# Patient Record
Sex: FEMALE | Race: WHITE | NOT HISPANIC OR LATINO | Employment: OTHER | URBAN - METROPOLITAN AREA
[De-identification: names, ages, dates, MRNs, and addresses within clinical notes are randomized per-mention and may not be internally consistent; named-entity substitution may affect disease eponyms.]

---

## 2018-02-04 ENCOUNTER — OFFICE VISIT (OUTPATIENT)
Dept: URGENT CARE | Facility: MEDICAL CENTER | Age: 73
End: 2018-02-04
Payer: COMMERCIAL

## 2018-02-04 DIAGNOSIS — S61.213A LACERATION OF LEFT MIDDLE FINGER WITHOUT FOREIGN BODY WITHOUT DAMAGE TO NAIL, INITIAL ENCOUNTER: Primary | ICD-10-CM

## 2018-02-04 PROCEDURE — 99203 OFFICE O/P NEW LOW 30 MIN: CPT | Performed by: PHYSICIAN ASSISTANT

## 2018-02-04 PROCEDURE — 90715 TDAP VACCINE 7 YRS/> IM: CPT

## 2018-02-04 PROCEDURE — 12001 RPR S/N/AX/GEN/TRNK 2.5CM/<: CPT | Performed by: PHYSICIAN ASSISTANT

## 2018-02-04 RX ORDER — LEVOTHYROXINE SODIUM 0.15 MG/1
TABLET ORAL
COMMUNITY
Start: 2017-12-07

## 2018-02-04 RX ORDER — NEBIVOLOL 5 MG/1
TABLET ORAL
COMMUNITY
Start: 2017-12-07

## 2018-02-04 RX ORDER — CANDESARTAN 32 MG/1
TABLET ORAL
COMMUNITY
Start: 2017-12-07

## 2018-02-04 RX ORDER — ATORVASTATIN CALCIUM 80 MG/1
TABLET, FILM COATED ORAL
COMMUNITY
Start: 2017-12-07

## 2018-02-04 RX ORDER — HYDROCHLOROTHIAZIDE 25 MG/1
TABLET ORAL
COMMUNITY
Start: 2017-12-07

## 2018-02-04 NOTE — PROGRESS NOTES
Assessment/Plan:    1  Tetanus given left deltoid IM  2  Return for suture removal in 7-10 days if unavailable to go to PCP  3  Look for increase redness, red streaking, fevers, or chills  If this happens go to ER or see PCP without delay  Patient Instructions   Finger Laceration   WHAT YOU NEED TO KNOW:   A finger laceration is a deep cut in your skin  It is often caused by a sharp object, such as a knife, or blunt force to your finger  Your blood vessels, bones, joints, tendons, or nerves may also be injured  DISCHARGE INSTRUCTIONS:   Return to the emergency department if:   · Your wound comes apart  · Blood soaks through your bandage  · You have severe pain in your finger or hand  · Your finger is pale and cold  · You have sudden trouble moving your finger  · Your swelling suddenly gets worse  · You have red streaks on your skin coming from your wound  Contact your healthcare provider or hand specialist if:   · You have new numbness or tingling  · Your finger feels warm, looks swollen or red, and is draining pus  · You have a fever  · You have questions or concerns about your condition or care  Medicines: You may  need any of the following:  · Antibiotics  help prevent a bacterial infection  · Acetaminophen  decreases pain and fever  It is available without a doctor's order  Ask how much to take and how often to take it  Follow directions  Read the labels of all other medicines you are using to see if they also contain acetaminophen, or ask your doctor or pharmacist  Acetaminophen can cause liver damage if not taken correctly  Do not use more than 4 grams (4,000 milligrams) total of acetaminophen in one day  · Prescription pain medicine  may be given  Ask your healthcare provider how to take this medicine safely  Some prescription pain medicines contain acetaminophen   Do not take other medicines that contain acetaminophen without talking to your healthcare provider  Too much acetaminophen may cause liver damage  Prescription pain medicine may cause constipation  Ask your healthcare provider how to prevent or treat constipation  · Take your medicine as directed  Contact your healthcare provider if you think your medicine is not helping or if you have side effects  Tell him or her if you are allergic to any medicine  Keep a list of the medicines, vitamins, and herbs you take  Include the amounts, and when and why you take them  Bring the list or the pill bottles to follow-up visits  Carry your medicine list with you in case of an emergency  Self-care:   · Apply ice  on your finger for 15 to 20 minutes every hour or as directed  Use an ice pack, or put crushed ice in a plastic bag  Cover it with a towel before you apply it to your skin  Ice helps prevent tissue damage and decreases swelling and pain  · Elevate  your hand above the level of your heart as often as you can  This will help decrease swelling and pain  Prop your hand on pillows or blankets to keep it elevated comfortably  · Wear your splint as directed  A splint will decrease movement and stress on your wound  The splint may help your wound heal faster  Ask your healthcare provider how to apply and remove a splint  · Apply ointments to decrease scarring  Do not apply ointments until your healthcare provider says it is okay  You may need to wait until your wound is healed  Ask which ointment to buy and how often to use it  Wound care:   · Do not get your wound wet until your healthcare provider says it is okay  Do not soak your hand in water  Do not go swimming until your healthcare provider says it is okay  When your healthcare provider says it is okay, carefully wash around the wound with soap and water  Let soap and water run over your wound  Gently pat the area dry or allow it to air dry  · Change your bandages when they get wet, dirty, or after washing    Apply new, clean bandages as directed  Do not apply elastic bandages or tape too tightly  Do not put powders or lotions on your wound  · Apply antibiotic ointment as directed  Your healthcare provider may give you antibiotic ointment to put over your wound if you have stitches  If you have Strips-Strips over your wound, let them dry up and fall off on their own  If they do not fall off within 14 days, gently remove them  If you have glue over your wound, do not remove or pick at it  If your glue comes off, do not replace it with glue that you have at home  · Check your wound every day for signs of infection  Signs of infection include swelling, redness, or pus  Follow up with your healthcare provider or hand specialist in 2 days:  Write down your questions so you remember to ask them during your visits  © 2017 [employer] Information is for End User's use only and may not be sold, redistributed or otherwise used for commercial purposes  All illustrations and images included in CareNotes® are the copyrighted property of [employer]  or [employer]  The above information is an  only  It is not intended as medical advice for individual conditions or treatments  Talk to your doctor, nurse or pharmacist before following any medical regimen to see if it is safe and effective for you             Diagnoses and all orders for this visit:    Laceration of left middle finger without foreign body without damage to nail, initial encounter  -     TDAP Vaccine greater than or equal to 8yo    Other orders  -     atorvastatin (LIPITOR) 80 mg tablet; TAKE 1 TABLET BY ORAL ROUTE EVERY DAY  -     nebivolol (BYSTOLIC) 5 mg tablet; TAKE 1 TABLET BY ORAL ROUTE EVERY DAY  -     candesartan (ATACAND) 32 MG tablet; TAKE 1 TABLET BY ORAL ROUTE EVERY DAY  -     hydrochlorothiazide (HYDRODIURIL) 25 mg tablet; TAKE 1 TABLET BY ORAL ROUTE EVERY DAY  -     levothyroxine 150 mcg tablet; TAKE 1 TABLET DAILY  - Burn Treatment  -     Laceration repair          Subjective:      Patient ID: Priscilla Huynh 67 y o  female        76-year-old female presents for evaluation of a laceration on her 3rd left digit today, while trying to cut bread with a knife  She applied direct pressure to the wound when it happened  she is unsure of her last tetanus shot  She denies any fevers or chills  She denies any exudate or foreign bodies in the wound  She denies being on any anticoagulation therapy  She denies any anti coagulation disorders  She has no difficulty moving  She denies any paresthesias in the finger  She denies any cold sensations  Wound Check           The following portions of the patient's history were reviewed and updated as appropriate: allergies, current medications, past family history, past medical history, past social history, past surgical history and problem list     Review of Systems   Constitutional: Negative  HENT: Negative  Respiratory: Negative  Cardiovascular: Negative  Skin: Positive for wound  Objective:    Physical Exam   Skin:            There were no vitals filed for this visit  Laceration repair  Date/Time: 2/4/2018 5:56 PM  Performed by: Asuncion Hughes  Authorized by: Asuncion Hughes   Consent: Verbal consent obtained  Consent given by: patient  Patient understanding: patient states understanding of the procedure being performed  Patient consent: the patient's understanding of the procedure matches consent given  Patient identity confirmed: verbally with patient  Time out: Immediately prior to procedure a "time out" was called to verify the correct patient, procedure, equipment, support staff and site/side marked as required  Location: left middle finger, palmar aspect between the DIP and PIP joint    Laceration length: 2 cm  Foreign bodies: no foreign bodies  Tendon involvement: none  Nerve involvement: none  Vascular damage: no  Anesthesia: digital block    Anesthesia:  Local Anesthetic: lidocaine 1% without epinephrine  Anesthetic total: 5 mL    Sedation:  Patient sedated: no    Wound Dehiscence:  Superficial Wound Dehiscence: simple closure      Procedure Details:  Preparation: Patient was prepped and draped in the usual sterile fashion  Irrigation solution: saline  Irrigation method: syringe  Amount of cleaning: standard  Debridement: none  Degree of undermining: none  Skin closure: 5-0 nylon  Number of sutures: 4  Technique: simple  Approximation: close  Approximation difficulty: simple  Dressing: antibiotic ointment, gauze packing and non-adhesive packing strip  Patient tolerance: Patient tolerated the procedure well with no immediate complications        Patient has full mobility and is neurovascularly intact after the procedure    She states she will return to [facility] now in 7-10 days for suture removal

## 2018-02-17 ENCOUNTER — OFFICE VISIT (OUTPATIENT)
Dept: URGENT CARE | Facility: MEDICAL CENTER | Age: 73
End: 2018-02-17
Payer: COMMERCIAL

## 2018-02-17 VITALS
OXYGEN SATURATION: 100 % | HEART RATE: 76 BPM | RESPIRATION RATE: 16 BRPM | TEMPERATURE: 97.4 F | SYSTOLIC BLOOD PRESSURE: 132 MMHG | DIASTOLIC BLOOD PRESSURE: 70 MMHG

## 2018-02-17 DIAGNOSIS — H69.82 EUSTACHIAN TUBE DYSFUNCTION, LEFT: ICD-10-CM

## 2018-02-17 DIAGNOSIS — Z48.02 ENCOUNTER FOR REMOVAL OF SUTURES: Primary | ICD-10-CM

## 2018-02-17 PROCEDURE — 99213 OFFICE O/P EST LOW 20 MIN: CPT | Performed by: FAMILY MEDICINE

## 2018-02-17 PROCEDURE — G0463 HOSPITAL OUTPT CLINIC VISIT: HCPCS | Performed by: FAMILY MEDICINE

## 2018-02-17 NOTE — PROGRESS NOTES
[email or web address]        NAME: Tiana Rader is a 67 y o  female  : 1945    MRN: [id number]  DATE: 2018  TIME: 1:34 PM    Assessment and Plan   Encounter for removal of sutures [Z48 02]  1  Encounter for removal of sutures     2  Eustachian tube dysfunction, left           Patient Instructions     Follow up with PCP in 3-5 days  Proceed to  ER if symptoms worsen  Chief Complaint     Chief Complaint   Patient presents with    Wound Check     Pt here for suture removeal left 3rd finger   Earache     left ear         History of Present Illness   Mino Forman presents to the clinic c/o    Patient here today for suture removal from left middle finger  Sutures placed on   She has a total 4 sutures  Denies any bleeding or purulent discharge from the suture site  She is also here today because of left ear blockage also hearing loss for quite a while possibly several months  She was seen by [facility] several months ago and given Flonase nasal spray which she has not used  Denies any nasal congestion  Denies earache  Describes the sounds are muffled  Wound Check     Earache    Associated symptoms include hearing loss  Review of Systems   Review of Systems   HENT: Positive for ear pain and hearing loss  Skin: Positive for wound           Current Medications     Long-Term Prescriptions   Medication Sig Dispense Refill    atorvastatin (LIPITOR) 80 mg tablet TAKE 1 TABLET BY ORAL ROUTE EVERY DAY      candesartan (ATACAND) 32 MG tablet TAKE 1 TABLET BY ORAL ROUTE EVERY DAY      hydrochlorothiazide (HYDRODIURIL) 25 mg tablet TAKE 1 TABLET BY ORAL ROUTE EVERY DAY      levothyroxine 150 mcg tablet TAKE 1 TABLET DAILY      nebivolol (BYSTOLIC) 5 mg tablet TAKE 1 TABLET BY ORAL ROUTE EVERY DAY         Current Allergies     Allergies as of 2018    (No Known Allergies)            The following portions of the patient's history were reviewed and updated as appropriate: allergies, current medications, past family history, past medical history, past social history, past surgical history and problem list     Objective   /70 (BP Location: Right arm, Patient Position: Sitting, Cuff Size: Adult)   Pulse 76   Temp (!) 97 4 °F (36 3 °C) (Tympanic)   Resp 16   SpO2 100%        Physical Exam     Physical Exam   HENT:   Head: Normocephalic and atraumatic  Right Ear: External ear normal    Left Ear: External ear normal    Skin:   Right middle finger reveals laceration palmar aspect of the mid PIP joint well-approximated  Four sutures in place  There is no evidence of erythema or induration  Nursing note and vitals reviewed

## 2018-02-17 NOTE — PATIENT INSTRUCTIONS
For sutures removed from left middle finger with no complication  Patient tolerated procedure well  History and physical exam consistent with left eustachian tube dysfunction  I advised patient to resume fluticasone nasal spray-2 sprays in each nostril once daily as prescribed by [facility] Thro[facility] specialist   If no improvement, follow up with ENT specialist   Patient expressed understanding  Eustachian Tube Dysfunction   WHAT YOU NEED TO KNOW:   What is eustachian tube dysfunction? Eustachian tube dysfunction (ETD) is a condition that prevents your eustachian tubes from opening properly  It can also cause them to become blocked  Eustachian tubes connect your middle ear to the back of your nose and throat  These tubes open and allow air to flow in and out when you sneeze, swallow, or yawn  What causes or increases my risk of ETD? ETD may be caused by swelling or buildup of mucus in your eustachian tubes  Allergies, a cold, or sinus infection can increase your risk for ETD  Smoking also increases your risk for ETD  What are the signs and symptoms of ETD? · Fullness or pressure in your ears    · Muffled hearing    · Pain in one or both ears    · Ringing in your ears    · Popping or clicking feeling in your ears    · Trouble keeping your balance  How is ETD diagnosed? Your healthcare provider will ask about your symptoms  He will examine your ears, your nose, and the back of your throat  He may also do a hearing test    How is ETD treated? Your ETD may get better on its own without any treatment  You may need any of the following:  · Exercises  such as swallowing, yawning, or chewing gum may help to open your eustachian tubes  Your healthcare provider may also recommend that you take a deep breath and then blow with your mouth shut and your nostrils pinched closed  · Air pressure devices  push air into your nose and eustachian tubes to help relieve air pressure in your ear       · Treatment for allergies such as decongestants, antihistamines, and nasal steroids may improve ETD  They may help decrease swelling of the eustachian tubes  · Ear tubes  may help to keep your middle ear open  During this procedure, your healthcare provider will cut a small hole in your eardrum  · A myringotomy  is procedure to make a small cut in your eardrum and suction out fluid from your middle ear  · Tuboplasty  is a procedure to widen your eustachian tubes  When should I contact my healthcare provider? · Your symptoms do not improve or get worse  · You have a fever  · You have any hearing loss  · You have questions or concerns about your condition or care  CARE AGREEMENT:   You have the right to help plan your care  Learn about your health condition and how it may be treated  Discuss treatment options with your caregivers to decide what care you want to receive  You always have the right to refuse treatment  The above information is an  only  It is not intended as medical advice for individual conditions or treatments  Talk to your doctor, nurse or pharmacist before following any medical regimen to see if it is safe and effective for you  © 2017 [facility] Information is for End User's use only and may not be sold, redistributed or otherwise used for commercial purposes  All illustrations and images included in CareNotes® are the copyrighted property of [employer]  or [employer]

## 2018-06-29 ENCOUNTER — LAB REQUISITION (OUTPATIENT)
Dept: LAB | Facility: HOSPITAL | Age: 73
End: 2018-06-29
Payer: COMMERCIAL

## 2018-06-29 DIAGNOSIS — N89.8 OTHER SPECIFIED NONINFLAMMATORY DISORDERS OF VAGINA: ICD-10-CM

## 2018-06-29 DIAGNOSIS — N90.89 OTHER SPECIFIED NONINFLAMMATORY DISORDERS OF VULVA AND PERINEUM: ICD-10-CM

## 2018-06-29 PROCEDURE — 88305 TISSUE EXAM BY PATHOLOGIST: CPT | Performed by: PATHOLOGY

## 2018-11-13 ENCOUNTER — OFFICE VISIT (OUTPATIENT)
Dept: URGENT CARE | Facility: MEDICAL CENTER | Age: 73
End: 2018-11-13
Payer: COMMERCIAL

## 2018-11-13 VITALS
RESPIRATION RATE: 18 BRPM | HEIGHT: 68 IN | BODY MASS INDEX: 31.83 KG/M2 | WEIGHT: 210 LBS | SYSTOLIC BLOOD PRESSURE: 137 MMHG | DIASTOLIC BLOOD PRESSURE: 80 MMHG | HEART RATE: 67 BPM | TEMPERATURE: 97 F | OXYGEN SATURATION: 92 %

## 2018-11-13 DIAGNOSIS — Z71.1 WORRIED WELL: Primary | ICD-10-CM

## 2018-11-13 PROCEDURE — 99213 OFFICE O/P EST LOW 20 MIN: CPT | Performed by: FAMILY MEDICINE

## 2018-11-14 NOTE — PROGRESS NOTES
[email or web address]        NAME: Danya Rivera is a 67 y o  female  : 1945    MRN: [id number]  DATE: 2018  TIME: 8:05 PM    Assessment and Plan   Worried well [Z71 1]  1  Worried well           Patient Instructions       Follow up with PCP in 3-5 days  Proceed to  ER if symptoms worsen  Chief Complaint     Chief Complaint   Patient presents with   [address]     wants to check for head lice, grandchildren had them on Saturday  History of Present Illness       67year old female whose grand kids have head lice  She had contact with them 10 days ago  She denies any symptoms of itchiness  She has not seen any lice or nits  Desires to be checked  Has not used any OTC meds         Review of Systems   Review of Systems  As above     Current Medications       Current Outpatient Prescriptions:     atorvastatin (LIPITOR) 80 mg tablet, TAKE 1 TABLET BY ORAL ROUTE EVERY DAY, Disp: , Rfl:     candesartan (ATACAND) 32 MG tablet, TAKE 1 TABLET BY ORAL ROUTE EVERY DAY, Disp: , Rfl:     hydrochlorothiazide (HYDRODIURIL) 25 mg tablet, TAKE 1 TABLET BY ORAL ROUTE EVERY DAY, Disp: , Rfl:     levothyroxine 150 mcg tablet, TAKE 1 TABLET DAILY, Disp: , Rfl:     nebivolol (BYSTOLIC) 5 mg tablet, TAKE 1 TABLET BY ORAL ROUTE EVERY DAY, Disp: , Rfl:     Current Allergies     Allergies as of 2018 - Reviewed 2018   Allergen Reaction Noted    Sulfa antibiotics  2018            The following portions of the patient's history were reviewed and updated as appropriate: allergies, current medications, past family history, past medical history, past social history, past surgical history and problem list      Past Medical History:   Diagnosis Date    Hypertension        History reviewed  No pertinent surgical history  History reviewed  No pertinent family history  Medications have been verified          Objective   /80   Pulse 67   Temp (!) 97 °F (36 1 °C)   Resp 18  5' 8" (1 727 m)   Wt 95 3 kg (210 lb)   SpO2 92%   BMI 31 93 kg/m²        Physical Exam     Physical Exam   Constitutional: She is oriented to person, place, and time  She appears well-developed and well-nourished  HENT:   Head: Normocephalic and atraumatic  Scalp clean without any signs of lice or nits    Eyes: Conjunctivae are normal    Neck: Neck supple  Cardiovascular: Normal rate  Pulmonary/Chest: Effort normal  No respiratory distress  Abdominal: Soft  Musculoskeletal: Normal range of motion  Neurological: She is alert and oriented to person, place, and time  Skin: Skin is warm and dry  Psychiatric: She has a normal mood and affect  Her behavior is normal    Nursing note and vitals reviewed

## 2018-11-14 NOTE — PATIENT INSTRUCTIONS
Pediculosis   WHAT YOU NEED TO KNOW:   Pediculosis is a lice infestation of the hairy areas on the body  Lice are tiny bugs that bite into the skin and suck blood to live and grow  The most common areas of infestation are the scalp or genitals  Eyebrows, eyelashes, chest hair, or underarm hair may also be infested  DISCHARGE INSTRUCTIONS:   Self-care:   · Comb your hair to remove all nits  Lice medicine may not kill or remove all the nits  Use your fingernails or a fine-toothed comb to remove dead or dying lice and nits that are stuck to your hair  Nit echevarria come in some lice treatment packages  To make it easier to comb out nits, soak your hair with a solution of ½ white vinegar and ½ water  Cover your hair with a bathing cap or towel for 30 minutes  Then remove the cap or towel and comb from the scalp outward  You may also use a gel that loosens nits  This may be bought over-the-counter  · Clean clothes and bedding  Clean all items that you have used since 2 days before you learned you had lice  Wash items like sheets, clothes, and towels using the hot water cycle  Dry on the hot cycle for at least 20 minutes  Dry clean items that cannot be washed in a washing machine  You can also hang these items outside for 2 days  Or, you can put them into a closed plastic bag for 2 weeks if you have head lice  Keep items in a closed plastic bag for 4 weeks if you have body lice or pubic lice  · Disinfect all hair items  Soak echevarria, brushes, and all hair items in rubbing alcohol, an antiseptic, or anti-lice shampoo for at least 1 hour  You may also put them in boiling water for at least 10 minutes  · Vacuum  carpets, rugs, car seats, and furniture  · Tell anyone who has been close to you to be checked for lice  This includes friends, classmates, family, or sex partners  · Do not share personal items , such as echevarria and brushes, clothes, and hats    Medicines:   · Medical shampoos, creams, or lotions will kill the lice  They may be prescription or over-the-counter  Ask your healthcare provider for help choosing the right lice medicine  Do not use lice medicine on children younger than 3years old  Instead, use regular shampoo and pick the nits or lice off the scalp and hair  Never use gasoline, kerosene, or other oil products to treat lice  · Take your medicine as directed  Contact your healthcare provider if you think your medicine is not helping or if you have side effects  Tell him or her if you are allergic to any medicine  Keep a list of the medicines, vitamins, and herbs you take  Include the amounts, and when and why you take them  Bring the list or the pill bottles to follow-up visits  Carry your medicine list with you in case of an emergency  Return to work or school: You can return to work or school after you complete treatment, even if you still have nits  If your child has lice, tell his school or  center  Follow up with your healthcare provider as directed:  Write down your questions so you remember to ask them during your visits  Contact your healthcare provider if:   · The lice bites become crusty or fill with pus  · You have matted, foul-smelling scalp and hair  · You see live lice or new nits more than 2 days after you use lice medicine  · You have trouble sleeping due to itching    · You have questions or concerns about your condition or care  © 2017 [employer] Information is for End User's use only and may not be sold, redistributed or otherwise used for commercial purposes  All illustrations and images included in CareNotes® are the copyrighted property of [employer]  or [employer]  The above information is an  only  It is not intended as medical advice for individual conditions or treatments  Talk to your doctor, nurse or pharmacist before following any medical regimen to see if it is safe and effective for you

## 2021-07-05 ENCOUNTER — OFFICE VISIT (OUTPATIENT)
Dept: URGENT CARE | Facility: MEDICAL CENTER | Age: 76
End: 2021-07-05
Payer: COMMERCIAL

## 2021-07-05 VITALS
TEMPERATURE: 98.3 F | RESPIRATION RATE: 20 BRPM | WEIGHT: 215 LBS | OXYGEN SATURATION: 95 % | DIASTOLIC BLOOD PRESSURE: 80 MMHG | BODY MASS INDEX: 31.84 KG/M2 | HEIGHT: 69 IN | HEART RATE: 64 BPM | SYSTOLIC BLOOD PRESSURE: 178 MMHG

## 2021-07-05 DIAGNOSIS — N39.0 URINARY TRACT INFECTION WITHOUT HEMATURIA, SITE UNSPECIFIED: Primary | ICD-10-CM

## 2021-07-05 LAB
SL AMB  POCT GLUCOSE, UA: ABNORMAL
SL AMB LEUKOCYTE ESTERASE,UA: ABNORMAL
SL AMB POCT BILIRUBIN,UA: ABNORMAL
SL AMB POCT BLOOD,UA: ABNORMAL
SL AMB POCT CLARITY,UA: CLEAR
SL AMB POCT COLOR,UA: YELLOW
SL AMB POCT KETONES,UA: ABNORMAL
SL AMB POCT NITRITE,UA: ABNORMAL
SL AMB POCT PH,UA: 6
SL AMB POCT SPECIFIC GRAVITY,UA: 1.03
SL AMB POCT URINE PROTEIN: ABNORMAL
SL AMB POCT UROBILINOGEN: ABNORMAL

## 2021-07-05 PROCEDURE — 99213 OFFICE O/P EST LOW 20 MIN: CPT | Performed by: PHYSICIAN ASSISTANT

## 2021-07-05 PROCEDURE — 81002 URINALYSIS NONAUTO W/O SCOPE: CPT | Performed by: PHYSICIAN ASSISTANT

## 2021-07-05 PROCEDURE — 87086 URINE CULTURE/COLONY COUNT: CPT | Performed by: PHYSICIAN ASSISTANT

## 2021-07-05 RX ORDER — CEPHALEXIN 500 MG/1
500 CAPSULE ORAL EVERY 12 HOURS SCHEDULED
Qty: 20 CAPSULE | Refills: 0 | Status: SHIPPED | OUTPATIENT
Start: 2021-07-05 | End: 2021-07-15

## 2021-07-05 NOTE — PATIENT INSTRUCTIONS
Urinary Tract Infection in Older Adults   WHAT YOU NEED TO KNOW:   A urinary tract infection (UTI) is caused by bacteria that get inside your urinary tract  Your urinary tract includes your kidneys, ureters, bladder, and urethra  Urine is made in your kidneys, and it flows from the ureters to the bladder  Urine leaves the bladder through the urethra  A UTI is more common in your lower urinary tract, which includes your bladder and urethra  DISCHARGE INSTRUCTIONS:   Return to the emergency department if:   · You are urinating very little or not at all  · You are vomiting  · You have a high fever with shaking chills  · You have side or back pain that gets worse  Call your doctor if:   · You have a fever  · You are a woman and you have increased white or yellow discharge from your vagina  · You do not feel better after 2 days of taking antibiotics  · You have questions or concerns about your condition or care  Medicines:   · Medicines  help treat the bacterial infection or decrease pain and burning when you urinate  You may also need medicines to decrease the urge to urinate often  If you have UTIs often (called recurrent UTIs), you may be given antibiotics to take regularly  You will be given directions for when and how to use antibiotics  The goal is to prevent UTIs but not cause antibiotic resistance by using antibiotics too often  · Take your medicine as directed  Contact your healthcare provider if you think your medicine is not helping or if you have side effects  Tell him or her if you are allergic to any medicine  Keep a list of the medicines, vitamins, and herbs you take  Include the amounts, and when and why you take them  Bring the list or the pill bottles to follow-up visits  Carry your medicine list with you in case of an emergency  Self-care:   · Drink liquids as directed  Liquids can help flush bacteria from your urinary tract   Ask how much liquid to drink each day and which liquids are best for you  You may need to drink more liquids than usual to help flush out the bacteria  Do not drink alcohol, caffeine, and citrus juices  These can irritate your bladder and increase your symptoms  · Apply heat  on your abdomen for 20 to 30 minutes every 2 hours for as many days as directed  Heat helps decrease discomfort and pressure in your bladder  Prevent a UTI:   · Urinate when you feel the urge  Do not hold your urine  Bacteria can grow if urine stays in the bladder too long  It may be helpful to urinate at least every 3 to 4 hours  · Urinate after you have sex  to flush away bacteria that can enter your urinary tract during sex  · Wear cotton underwear and clothes that are loose  Tight pants and nylon underwear can trap moisture and cause bacteria to grow  · Cranberry juice or cranberry supplements  may help prevent UTIs  Your healthcare provider can recommend the right juice or supplement for you  · Women should wipe front to back  after urinating or having a bowel movement  This may prevent germs from getting into the urinary tract  Do not douche or use feminine deodorants  These can change the chemical balance in your vagina  You may also be given vaginal estrogen medicine  This medicine helps prevent recurrent UTIs in women who have gone through menopause or are in oj-menopause  Follow up with your healthcare provider as directed:  Write down your questions so you remember to ask them during your visits  © Copyright [address] Information is for End User's use only and may not be sold, redistributed or otherwise used for commercial purposes  All illustrations and images included in CareNotes® are the copyrighted property of A D A M , Inc  or [facility]   The above information is an  only  It is not intended as medical advice for individual conditions or treatments   Talk to your doctor, nurse or pharmacist before following any medical regimen to see if it is safe and effective for you

## 2021-07-05 NOTE — PROGRESS NOTES
[facility]        NAME: Nicole Calloway is a 67 y o  female  : 1945    MRN: [id number]  DATE: 2021  TIME: 3:54 PM    BP (!) 178/80   Pulse 64   Temp 98 3 °F (36 8 °C)   Resp 20   Ht 5' 8 5" (1 74 m)   Wt 97 5 kg (215 lb)   SpO2 95%   BMI 32 22 kg/m²     Assessment and Plan   Urinary tract infection without hematuria, site unspecified [N39 0]  1  Urinary tract infection without hematuria, site unspecified  POCT urine dip    Urine culture    cephalexin (KEFLEX) 500 mg capsule         Patient Instructions       Follow up with PCP in 3-5 days  Proceed to  ER if symptoms worsen  Chief Complaint     Chief Complaint   Patient presents with    Possible UTI     patient states he has had burning with urination for  2 months         History of Present Illness       Pt with burning with urine x 1 month  Pt denies any other problems       Review of Systems   Review of Systems   Constitutional: Negative  HENT: Negative  Eyes: Negative  Respiratory: Negative  Cardiovascular: Negative  Gastrointestinal: Negative  Endocrine: Negative  Genitourinary: Negative  Musculoskeletal: Negative  Skin: Negative  Allergic/Immunologic: Negative  Neurological: Negative  Hematological: Negative  Psychiatric/Behavioral: Negative  All other systems reviewed and are negative          Current Medications       Current Outpatient Medications:     atorvastatin (LIPITOR) 80 mg tablet, TAKE 1 TABLET BY ORAL ROUTE EVERY DAY, Disp: , Rfl:     candesartan (ATACAND) 32 MG tablet, TAKE 1 TABLET BY ORAL ROUTE EVERY DAY, Disp: , Rfl:     hydrochlorothiazide (HYDRODIURIL) 25 mg tablet, TAKE 1 TABLET BY ORAL ROUTE EVERY DAY, Disp: , Rfl:     levothyroxine 150 mcg tablet, TAKE 1 TABLET DAILY, Disp: , Rfl:     nebivolol (BYSTOLIC) 5 mg tablet, TAKE 1 TABLET BY ORAL ROUTE EVERY DAY, Disp: , Rfl:     cephalexin (KEFLEX) 500 mg capsule, Take 1 capsule (500 mg total) by mouth every 12 (twelve) hours for 10 days, Disp: 20 capsule, Rfl: 0    Current Allergies     Allergies as of 07/05/2021 - Reviewed 07/05/2021   Allergen Reaction Noted    Sulfa antibiotics  11/13/2018            The following portions of the patient's history were reviewed and updated as appropriate: allergies, current medications, past family history, past medical history, past social history, past surgical history and problem list      Past Medical History:   Diagnosis Date    Disease of thyroid gland     GERD (gastroesophageal reflux disease)     Hypertension        History reviewed  No pertinent surgical history  History reviewed  No pertinent family history  Medications have been verified  Objective   BP (!) 178/80   Pulse 64   Temp 98 3 °F (36 8 °C)   Resp 20   Ht 5' 8 5" (1 74 m)   Wt 97 5 kg (215 lb)   SpO2 95%   BMI 32 22 kg/m²        Physical Exam     Physical Exam  Vitals and nursing note reviewed  Constitutional:       Appearance: Normal appearance  She is normal weight  HENT:      Head: Normocephalic and atraumatic  Right Ear: Tympanic membrane, ear canal and external ear normal       Left Ear: Tympanic membrane, ear canal and external ear normal       Nose: Nose normal       Mouth/Throat:      Mouth: Mucous membranes are moist       Pharynx: Oropharynx is clear  Eyes:      Extraocular Movements: Extraocular movements intact  Conjunctiva/sclera: Conjunctivae normal       Pupils: Pupils are equal, round, and reactive to light  Cardiovascular:      Rate and Rhythm: Normal rate and regular rhythm  Pulses: Normal pulses  Heart sounds: Normal heart sounds  Pulmonary:      Effort: Pulmonary effort is normal       Breath sounds: Normal breath sounds  Abdominal:      General: Abdomen is flat  Bowel sounds are normal       Palpations: Abdomen is soft  Musculoskeletal:         General: Normal range of motion  Cervical back: Normal range of motion and neck supple  Skin:     General: Skin is warm  Capillary Refill: Capillary refill takes less than 2 seconds  Neurological:      General: No focal deficit present  Mental Status: She is alert and oriented to person, place, and time     Psychiatric:         Mood and Affect: Mood normal

## 2021-07-07 LAB — BACTERIA UR CULT: NORMAL

## 2021-09-18 ENCOUNTER — OFFICE VISIT (OUTPATIENT)
Dept: URGENT CARE | Facility: MEDICAL CENTER | Age: 76
End: 2021-09-18
Payer: COMMERCIAL

## 2021-09-18 ENCOUNTER — APPOINTMENT (OUTPATIENT)
Dept: RADIOLOGY | Facility: MEDICAL CENTER | Age: 76
End: 2021-09-18
Payer: COMMERCIAL

## 2021-09-18 VITALS
OXYGEN SATURATION: 98 % | HEART RATE: 60 BPM | BODY MASS INDEX: 34.1 KG/M2 | TEMPERATURE: 98.8 F | RESPIRATION RATE: 20 BRPM | DIASTOLIC BLOOD PRESSURE: 72 MMHG | HEIGHT: 68 IN | SYSTOLIC BLOOD PRESSURE: 179 MMHG | WEIGHT: 225 LBS

## 2021-09-18 DIAGNOSIS — M25.561 ACUTE PAIN OF RIGHT KNEE: ICD-10-CM

## 2021-09-18 DIAGNOSIS — M25.561 ACUTE PAIN OF RIGHT KNEE: Primary | ICD-10-CM

## 2021-09-18 PROCEDURE — 73562 X-RAY EXAM OF KNEE 3: CPT

## 2021-09-18 PROCEDURE — 99213 OFFICE O/P EST LOW 20 MIN: CPT | Performed by: NURSE PRACTITIONER

## 2021-09-18 RX ORDER — OMEPRAZOLE 20 MG/1
20 CAPSULE, DELAYED RELEASE ORAL DAILY
COMMUNITY
Start: 2021-07-05

## 2021-09-18 RX ORDER — CONJUGATED ESTROGENS 0.62 MG/G
CREAM VAGINAL
COMMUNITY
Start: 2020-12-23 | End: 2021-12-23

## 2021-09-18 NOTE — PATIENT INSTRUCTIONS
We saw you today for knee pain  Wear ace wrap as tolerated  Rest, ice, elevate  Follow-up with an orthopedist if you still have pain in one week  I do not think your knee discomfort is related to the ciprofloxacin; it is likely related to the cleaning and overuse

## 2021-09-18 NOTE — PROGRESS NOTES
Assessment/Plan    Acute pain of right knee [M25 561]  1  Acute pain of right knee  XR knee 3 vw right non injury         Subjective:     Patient ID: Nicole Osborne is a 67 y o  female  Reason For Visit / Chief Complaint  Chief Complaint   Patient presents with    Knee Pain     Patient states she started 2 dys ago with pain in her R knee and her entire leg hurts  She states she was cleaning and it started after that  Patient is on cipro and is concerned that thisiis causing her pain         This is a 67year-old female c/o knee pain for two days  She denies swelling or injury  She does states it radiates at times to her toes and it feels better when she stands  Patient reports that she was doing a lot of cleaning two days ago which is more activity than usual for her and has had the pain since  Patient is concerned because she is taking ciprofloxacin and she read in the medication insert that it can cause tendonitis  Patient denies wound, erythema or warmth  Past Medical History:   Diagnosis Date    Disease of thyroid gland     GERD (gastroesophageal reflux disease)     Hypertension        Past Surgical History:   Procedure Laterality Date    HYSTERECTOMY         History reviewed  No pertinent family history  Review of Systems   Constitutional: Negative for chills, fatigue and fever  Respiratory: Negative for shortness of breath  Cardiovascular: Negative for chest pain  Musculoskeletal: Positive for arthralgias  Negative for gait problem and joint swelling  Skin: Negative for color change and wound  Neurological: Negative for headaches  Objective:    BP (!) 179/72   Pulse 60   Temp 98 8 °F (37 1 °C)   Resp 20   Ht 5' 8" (1 727 m)   Wt 102 kg (225 lb)   SpO2 98%   BMI 34 21 kg/m²     Physical Exam  Constitutional:       General: She is not in acute distress  Appearance: Normal appearance  She is not ill-appearing     HENT:      Head: Normocephalic and atraumatic  Cardiovascular:      Rate and Rhythm: Normal rate  Pulses: Normal pulses  Pulmonary:      Effort: Pulmonary effort is normal    Musculoskeletal:         General: Tenderness present  No swelling, deformity or signs of injury  Normal range of motion  Right lower leg: No swelling, deformity or bony tenderness  No edema  Comments: Patient TTP anteriorly and laterally  No swelling, redness or warmth  Negative nathaniel's sign  No muscle spasm or tendonitis present  Skin:     General: Skin is warm and dry  Capillary Refill: Capillary refill takes less than 2 seconds  Coloration: Skin is not pale  Findings: No erythema or rash  Neurological:      Mental Status: She is alert and oriented to person, place, and time  Psychiatric:         Mood and Affect: Mood normal          Xray: no acute fx  Age-related changes  Placed in ace wrap

## 2022-04-21 ENCOUNTER — OFFICE VISIT (OUTPATIENT)
Dept: URGENT CARE | Facility: MEDICAL CENTER | Age: 77
End: 2022-04-21
Payer: COMMERCIAL

## 2022-04-21 VITALS
DIASTOLIC BLOOD PRESSURE: 92 MMHG | WEIGHT: 222 LBS | OXYGEN SATURATION: 99 % | RESPIRATION RATE: 20 BRPM | HEIGHT: 68 IN | HEART RATE: 69 BPM | BODY MASS INDEX: 33.65 KG/M2 | SYSTOLIC BLOOD PRESSURE: 180 MMHG | TEMPERATURE: 98.7 F

## 2022-04-21 DIAGNOSIS — J30.9 ALLERGIC RHINITIS, UNSPECIFIED SEASONALITY, UNSPECIFIED TRIGGER: ICD-10-CM

## 2022-04-21 DIAGNOSIS — R31.9 URINARY TRACT INFECTION WITH HEMATURIA, SITE UNSPECIFIED: Primary | ICD-10-CM

## 2022-04-21 DIAGNOSIS — N39.0 URINARY TRACT INFECTION WITH HEMATURIA, SITE UNSPECIFIED: Primary | ICD-10-CM

## 2022-04-21 LAB
SL AMB  POCT GLUCOSE, UA: ABNORMAL
SL AMB LEUKOCYTE ESTERASE,UA: ABNORMAL
SL AMB POCT BILIRUBIN,UA: ABNORMAL
SL AMB POCT BLOOD,UA: ABNORMAL
SL AMB POCT CLARITY,UA: CLEAR
SL AMB POCT COLOR,UA: YELLOW
SL AMB POCT KETONES,UA: ABNORMAL
SL AMB POCT NITRITE,UA: ABNORMAL
SL AMB POCT PH,UA: 6
SL AMB POCT SPECIFIC GRAVITY,UA: 1.03
SL AMB POCT URINE PROTEIN: ABNORMAL
SL AMB POCT UROBILINOGEN: 0.2

## 2022-04-21 PROCEDURE — 87086 URINE CULTURE/COLONY COUNT: CPT | Performed by: FAMILY MEDICINE

## 2022-04-21 PROCEDURE — 87186 SC STD MICRODIL/AGAR DIL: CPT | Performed by: FAMILY MEDICINE

## 2022-04-21 PROCEDURE — 99213 OFFICE O/P EST LOW 20 MIN: CPT | Performed by: FAMILY MEDICINE

## 2022-04-21 RX ORDER — FLUTICASONE PROPIONATE 50 MCG
2 SPRAY, SUSPENSION (ML) NASAL DAILY
Qty: 16 G | Refills: 0 | Status: SHIPPED | OUTPATIENT
Start: 2022-04-21

## 2022-04-21 RX ORDER — BENZONATATE 200 MG/1
200 CAPSULE ORAL 3 TIMES DAILY PRN
Qty: 20 CAPSULE | Refills: 0 | Status: SHIPPED | OUTPATIENT
Start: 2022-04-21

## 2022-04-21 RX ORDER — NITROFURANTOIN 25; 75 MG/1; MG/1
100 CAPSULE ORAL 2 TIMES DAILY
Qty: 14 CAPSULE | Refills: 0 | Status: SHIPPED | OUTPATIENT
Start: 2022-04-21

## 2022-04-21 NOTE — PATIENT INSTRUCTIONS
Urine dipstick reveals trace leukocytes and trace blood  Urine culture performed  Patient was started empirically on Macrobid 100 mg b i d  for 7 days  Also prescribed fluticasone nasal spray for congestion and Tessalon Perles 200 mg every 8 hours  Urinary Tract Infection in Women   WHAT YOU NEED TO KNOW:   A urinary tract infection (UTI) is caused by bacteria that get inside your urinary tract  Most bacteria that enter your urinary tract come out when you urinate  If the bacteria stay in your urinary tract, you may get an infection  Your urinary tract includes your kidneys, ureters, bladder, and urethra  Urine is made in your kidneys, and it flows from the ureters to the bladder  Urine leaves the bladder through the urethra  A UTI is more common in your lower urinary tract, which includes your bladder and urethra  DISCHARGE INSTRUCTIONS:   Return to the emergency department if:   · You are urinating very little or not at all  · You have a high fever with shaking chills  · You have side or back pain that gets worse  Call your doctor if:   · You have a fever  · You do not feel better after 2 days of taking antibiotics  · You are vomiting  · You have questions or concerns about your condition or care  Medicines:   · Antibiotics  help fight a bacterial infection  If you have UTIs often (called recurrent UTIs), you may be given antibiotics to take regularly  You will be given directions for when and how to use antibiotics  The goal is to prevent UTIs but not cause antibiotic resistance by using antibiotics too often  · Medicines  may be given to decrease pain and burning when you urinate  They will also help decrease the feeling that you need to urinate often  These medicines will make your urine orange or red  · Take your medicine as directed  Contact your healthcare provider if you think your medicine is not helping or if you have side effects   Tell him or her if you are allergic to any medicine  Keep a list of the medicines, vitamins, and herbs you take  Include the amounts, and when and why you take them  Bring the list or the pill bottles to follow-up visits  Carry your medicine list with you in case of an emergency  Follow up with your doctor as directed:  Write down your questions so you remember to ask them during your visits  Prevent another UTI:   · Empty your bladder often  Urinate and empty your bladder as soon as you feel the need  Do not hold your urine for long periods of time  · Wipe from front to back after you urinate or have a bowel movement  This will help prevent germs from getting into your urinary tract through your urethra  · Drink liquids as directed  Ask how much liquid to drink each day and which liquids are best for you  You may need to drink more liquids than usual to help flush out the bacteria  Do not drink alcohol, caffeine, or citrus juices  These can irritate your bladder and increase your symptoms  Your healthcare provider may recommend cranberry juice to help prevent a UTI  · Urinate after you have sex  This can help flush out bacteria passed during sex  · Do not douche or use feminine deodorants  These can change the chemical balance in your vagina  · Change sanitary pads or tampons often  This will help prevent germs from getting into your urinary tract  · Talk to your healthcare provider about your birth control method  You may need to change your method if it is increasing your risk for UTIs  · Wear cotton underwear and clothes that are loose  Tight pants and nylon underwear can trap moisture and cause bacteria to grow  · Vaginal estrogen may be recommended  This medicine helps prevent UTIs in women who have gone through menopause or are in oj-menopause  · Do pelvic muscle exercises often  Pelvic muscle exercises may help you start and stop urinating   Strong pelvic muscles may help you empty your bladder easier  Squeeze these muscles tightly for 5 seconds like you are trying to hold back urine  Then relax for 5 seconds  Gradually work up to squeezing for 10 seconds  Do 3 sets of 15 repetitions a day, or as directed  © Copyright [employer] 2022 Information is for End User's use only and may not be sold, redistributed or otherwise used for commercial purposes  All illustrations and images included in CareNotes® are the copyrighted property of [employer]  or [employer] Rios  The above information is an  only  It is not intended as medical advice for individual conditions or treatments  Talk to your doctor, nurse or pharmacist before following any medical regimen to see if it is safe and effective for you  Allergic Rhinitis   WHAT YOU NEED TO KNOW:   Allergic rhinitis, or hay fever, is swelling of the inside of your nose  The swelling is a reaction to allergens in the air  An allergen can be anything that causes an allergic reaction  Allergies to weeds, grass, trees, or mold often cause seasonal allergic rhinitis  Indoor dust mites, cockroaches, pet dander, or mold can also cause allergic rhinitis  DISCHARGE INSTRUCTIONS:   Call 911 for the following:   · You have chest pain or shortness of breath  Return to the emergency department if:   · You have severe pain  · You cough up blood  Contact your healthcare provider if:   · You have a fever  · You have ear or sinus pain, or a headache  · Your symptoms get worse, even after treatment  · You have yellow, green, brown, or bloody mucus coming from your nose  · Your nose is bleeding or you have pain inside your nose  · You have trouble sleeping because of your symptoms  · You have questions or concerns about your condition or care  Medicines:   · Medicines  help decrease your symptoms and clear your stuffy nose  · Take your medicine as directed    Contact your healthcare provider if you think your medicine is not helping or if you have side effects  Tell him of her if you are allergic to any medicine  Keep a list of the medicines, vitamins, and herbs you take  Include the amounts, and when and why you take them  Bring the list or the pill bottles to follow-up visits  Carry your medicine list with you in case of an emergency  How to manage allergic rhinitis:  The best way to manage allergic rhinitis is to avoid allergens that can trigger your symptoms  Any of the following may help decrease your symptoms:  · Rinse your nose and sinuses  with a salt water solution or use a salt water nasal spray  This will help thin the mucus in your nose and rinse away pollen and dirt  It will also help reduce swelling so you can breathe normally  Ask your healthcare provider how often to rinse your nose  · Reduce exposure to dust mites  Wash sheets and towels in hot water every week  Cover your pillows and mattresses with allergen-free covers  Limit the number of stuffed animals and soft toys your child has  Wash your child's toys in hot water regularly  Vacuum weekly and use a vacuum  with an air filter  If possible, get rid of carpets and curtains  These collect dust and dust mites  · Reduce exposure to pollen  Keep windows and doors closed in your house and car  Stay inside when air pollution or the pollen count is high  Run your air conditioner on recycle, and change air filters often  Shower and wash your hair before bed every night to rinse away pollen  · Reduce exposure to pet dander  If possible, do not keep cats, dogs, birds, or other pets  If you do keep pets in your home, keep them out of bedrooms and carpeted rooms  Bathe them often  · Reduce exposure to mold  Do not spend time in basements  Choose artificial plants instead of live plants  Keep your home's humidity at less than 45%  Do not have ponds or standing water in your home or yard  · Do not smoke  Avoid others who smoke  Ask your healthcare provider for information if you currently smoke and need help to quit  Follow up with your healthcare provider as directed: You may need to see an allergist often to control your symptoms  Write down your questions so you remember to ask them during your visits  © Copyright [employer] 2022 Information is for End User's use only and may not be sold, redistributed or otherwise used for commercial purposes  All illustrations and images included in CareNotes® are the copyrighted property of [employer]  or [employer]   The above information is an  only  It is not intended as medical advice for individual conditions or treatments  Talk to your doctor, nurse or pharmacist before following any medical regimen to see if it is safe and effective for you

## 2022-04-21 NOTE — PROGRESS NOTES
[facility]        NAME: Lane Fernandes is a 67 y o  female  : 1945    MRN: [id number]  DATE: 2022  TIME: 4:18 PM    Assessment and Plan   Urinary tract infection with hematuria, site unspecified [N39 0, R31 9]  1  Urinary tract infection with hematuria, site unspecified  benzonatate (TESSALON) 200 MG capsule    fluticasone (FLONASE) 50 mcg/act nasal spray    nitrofurantoin (MACROBID) 100 mg capsule    POCT urine dip auto non-scope    Urine culture    CANCELED: Urine culture   2  Allergic rhinitis, unspecified seasonality, unspecified trigger  benzonatate (TESSALON) 200 MG capsule    fluticasone (FLONASE) 50 mcg/act nasal spray    nitrofurantoin (MACROBID) 100 mg capsule         Patient Instructions       Follow up with PCP in 3-5 days  Proceed to  ER if symptoms worsen  Chief Complaint     Chief Complaint   Patient presents with    Possible UTI     x's 7 days     Cough     sneezing x's 1 day         History of Present Illness       79-year-old female here today with 2 complaints  First, complaining of urinary frequency, pelvic pain urgency  However denies dysuria  She has noticed some discoloration a urine and strong odor  Describes having her UTI approximately 1 month ago  Base also refers to coughing sneezing over the last day  Also describes clear rhinorrhea  Denies sinus pain and pressure  Does admit to some postnasal drip  Denies any shortness of breath or wheeze  Currently not taking any medication for symptoms since she has suspect she might have allergies  Review of Systems   Review of Systems   Constitutional: Negative  HENT: Positive for congestion, postnasal drip, rhinorrhea and sneezing  Respiratory: Positive for cough  Gastrointestinal: Positive for abdominal pain  Genitourinary: Positive for frequency, pelvic pain and urgency           Current Medications       Current Outpatient Medications:     atorvastatin (LIPITOR) 80 mg tablet, TAKE 1 TABLET BY ORAL ROUTE EVERY DAY, Disp: , Rfl:     benzonatate (TESSALON) 200 MG capsule, Take 1 capsule (200 mg total) by mouth 3 (three) times a day as needed for cough, Disp: 20 capsule, Rfl: 0    candesartan (ATACAND) 32 MG tablet, TAKE 1 TABLET BY ORAL ROUTE EVERY DAY, Disp: , Rfl:     conjugated estrogens (Premarin) vaginal cream, 1/2 applicator twice weekly at bed time  , Disp: , Rfl:     fluticasone (FLONASE) 50 mcg/act nasal spray, 2 sprays into each nostril daily, Disp: 16 g, Rfl: 0    hydrochlorothiazide (HYDRODIURIL) 25 mg tablet, TAKE 1 TABLET BY ORAL ROUTE EVERY DAY, Disp: , Rfl:     levothyroxine 150 mcg tablet, TAKE 1 TABLET DAILY, Disp: , Rfl:     nebivolol (BYSTOLIC) 5 mg tablet, TAKE 1 TABLET BY ORAL ROUTE EVERY DAY, Disp: , Rfl:     nitrofurantoin (MACROBID) 100 mg capsule, Take 1 capsule (100 mg total) by mouth 2 (two) times a day, Disp: 14 capsule, Rfl: 0    omeprazole (PriLOSEC) 20 mg delayed release capsule, Take 20 mg by mouth daily Take before a meal, Disp: , Rfl:     Current Allergies     Allergies as of 04/21/2022 - Reviewed 04/21/2022   Allergen Reaction Noted    Sulfa antibiotics  11/13/2018            The following portions of the patient's history were reviewed and updated as appropriate: allergies, current medications, past family history, past medical history, past social history, past surgical history and problem list      Past Medical History:   Diagnosis Date    Disease of thyroid gland     GERD (gastroesophageal reflux disease)     Hypertension        Past Surgical History:   Procedure Laterality Date    HYSTERECTOMY         No family history on file  Medications have been verified          Objective   BP (!) 180/92 (BP Location: Right arm, Patient Position: Sitting, Cuff Size: Large) Comment: manual on R side, sitting  Pulse 69   Temp 98 7 °F (37 1 °C) (Tympanic)   Resp 20   Ht 5' 8" (1 727 m)   Wt 101 kg (222 lb)   SpO2 99%   BMI 33 75 kg/m²   No LMP recorded   Patient is postmenopausal        Physical Exam     Physical Exam

## 2022-04-23 LAB
BACTERIA UR CULT: ABNORMAL
BACTERIA UR CULT: ABNORMAL

## 2022-11-10 DIAGNOSIS — J30.9 ALLERGIC RHINITIS, UNSPECIFIED SEASONALITY, UNSPECIFIED TRIGGER: ICD-10-CM

## 2022-11-10 DIAGNOSIS — R31.9 URINARY TRACT INFECTION WITH HEMATURIA, SITE UNSPECIFIED: ICD-10-CM

## 2022-11-10 DIAGNOSIS — N39.0 URINARY TRACT INFECTION WITH HEMATURIA, SITE UNSPECIFIED: ICD-10-CM

## 2022-11-14 RX ORDER — FLUTICASONE PROPIONATE 50 MCG
SPRAY, SUSPENSION (ML) NASAL
Qty: 16 ML | Refills: 0 | Status: SHIPPED | OUTPATIENT
Start: 2022-11-14 | End: 2022-11-24

## 2023-04-08 ENCOUNTER — OFFICE VISIT (OUTPATIENT)
Dept: URGENT CARE | Facility: MEDICAL CENTER | Age: 78
End: 2023-04-08

## 2023-04-08 VITALS
OXYGEN SATURATION: 97 % | HEART RATE: 63 BPM | DIASTOLIC BLOOD PRESSURE: 100 MMHG | BODY MASS INDEX: 33.34 KG/M2 | HEIGHT: 68 IN | SYSTOLIC BLOOD PRESSURE: 208 MMHG | TEMPERATURE: 98 F | WEIGHT: 220 LBS

## 2023-04-08 DIAGNOSIS — R30.0 DYSURIA: ICD-10-CM

## 2023-04-08 DIAGNOSIS — N30.00 ACUTE CYSTITIS WITHOUT HEMATURIA: Primary | ICD-10-CM

## 2023-04-08 DIAGNOSIS — I10 HYPERTENSION, UNSPECIFIED TYPE: ICD-10-CM

## 2023-04-08 LAB
SL AMB  POCT GLUCOSE, UA: ABNORMAL
SL AMB LEUKOCYTE ESTERASE,UA: ABNORMAL
SL AMB POCT BILIRUBIN,UA: ABNORMAL
SL AMB POCT BLOOD,UA: ABNORMAL
SL AMB POCT CLARITY,UA: CLEAR
SL AMB POCT COLOR,UA: YELLOW
SL AMB POCT KETONES,UA: 1.02
SL AMB POCT NITRITE,UA: ABNORMAL
SL AMB POCT PH,UA: 5
SL AMB POCT SPECIFIC GRAVITY,UA: 1
SL AMB POCT URINE PROTEIN: ABNORMAL
SL AMB POCT UROBILINOGEN: ABNORMAL

## 2023-04-08 RX ORDER — CIPROFLOXACIN 500 MG/1
500 TABLET, FILM COATED ORAL EVERY 24 HOURS
Qty: 3 TABLET | Refills: 0 | OUTPATIENT
Start: 2023-04-08 | End: 2023-04-08

## 2023-04-08 NOTE — PATIENT INSTRUCTIONS
Take prescription antibiotic as prescribed  Your urine sample was sent to our labs for culture   The office will contact you following results to either continue your current antibiotics or change to a different antibiotic that will work better  You should stay properly hydrated and frequently urinate to help flush out the infection  Warm compresses for abdominal discomfort  Follow up with your PCP in 3-5 days  Proceed to ER if your symptoms worsen  If you experience worsening abdominal/back pain, fever/chills, bloody urination

## 2023-04-08 NOTE — PROGRESS NOTES
[facility]        NAME: Wally Quintero is a 65 y o  female  : 1945    MRN: [id number]  DATE: 2023  TIME: 1:39 PM    Assessment and Plan   Acute cystitis without hematuria [N30 00]  1  Acute cystitis without hematuria  ciprofloxacin (CIPRO) 500 mg tablet      2  Dysuria  POCT urine dip    Urine culture    ciprofloxacin (CIPRO) 500 mg tablet      3  Hypertension, unspecified type  Transfer to other facility         POCT urine positive for leukocytes  Due to history of recurrent UTI's, will start patient on antibiotics  Discussed with patient that I will send to lab for culture  In regards to the patient's high blood pressure, a repeat bp check resulted in similarly high bp  I recommended to the patient that she proceed to the ER for evaluation  Patient Instructions     1  Take prescription antibiotic as prescribed  a  Your urine sample was sent to our labs for culture  The office will contact you following results to either continue your current antibiotics or change to a different antibiotic that will work better  2  You should stay properly hydrated and frequently urinate to help flush out the infection  3  Warm compresses for abdominal discomfort  4  Follow up with your PCP in 3-5 days  5  Proceed to ER if your symptoms worsen  a  If you experience worsening abdominal/back pain, fever/chills, bloody urination       Chief Complaint     Chief Complaint   Patient presents with   • Possible UTI     Patient states 3-4 days ago she started with burning and increased frequency         History of Present Illness       65 YOF presents to the urgent care today for evaluation of urinary burning and frequency  She states that her symptoms began approximately 4 days ago  The patient has a history of recurrent UTI, her last infection occurring about a month ago  She states that at that time her PCP had placed her on Ciprofloxacin, which treated the infection without issue   The patient denies any fevers or chills  She denies noticing any blood in her urine or stool  The patient admits to feeling nauseous today, but denies any diarrhea or vomiting  She denies any abdominal pain or back pain  Today it is also noted that the patient has elevated blood pressure  The patient reports a past medical history of HTN, for which she takes cadesartan, HCTZ, and nebivolol  The patient admits that she has been very fatigued lately, and stressed as her sister had recently passed aware, leaving the patient to care for her son  The patient denies any chest pain, headache, dizziness, lightheadedness  The patient has a medical history of high cholesterol, but denies any heart conditions  Heart disease does run in the family, per patient  Review of Systems   Review of Systems   Constitutional: Negative for chills and fever  HENT: Negative for ear pain and sore throat  Eyes: Negative for pain and visual disturbance  Respiratory: Negative for cough and shortness of breath  Cardiovascular: Negative for chest pain and palpitations  Gastrointestinal: Positive for nausea  Negative for abdominal pain, blood in stool, diarrhea and vomiting  Genitourinary: Positive for dysuria, frequency and urgency  Negative for flank pain, hematuria and vaginal bleeding  Musculoskeletal: Negative for arthralgias and back pain  Skin: Negative for color change and rash  Neurological: Positive for headaches (chronic headaches, not currently)  Negative for dizziness, seizures, syncope, speech difficulty, weakness and light-headedness  Psychiatric/Behavioral: Negative  All other systems reviewed and are negative          Current Medications       Current Outpatient Medications:   •  ciprofloxacin (CIPRO) 500 mg tablet, Take 1 tablet (500 mg total) by mouth every 24 hours for 3 days, Disp: 3 tablet, Rfl: 0  •  atorvastatin (LIPITOR) 80 mg tablet, TAKE 1 TABLET BY ORAL ROUTE EVERY DAY, Disp: , Rfl:   • "benzonatate (TESSALON) 200 MG capsule, Take 1 capsule (200 mg total) by mouth 3 (three) times a day as needed for cough (Patient not taking: Reported on 4/8/2023), Disp: 20 capsule, Rfl: 0  •  candesartan (ATACAND) 32 MG tablet, TAKE 1 TABLET BY ORAL ROUTE EVERY DAY, Disp: , Rfl:   •  conjugated estrogens (Premarin) vaginal cream, 1/2 applicator twice weekly at bed time  , Disp: , Rfl:   •  fluticasone (FLONASE) 50 mcg/act nasal spray, SPRAY 2 SPRAYS INTO EACH NOSTRIL EVERY DAY, Disp: 16 mL, Rfl: 0  •  hydrochlorothiazide (HYDRODIURIL) 25 mg tablet, TAKE 1 TABLET BY ORAL ROUTE EVERY DAY, Disp: , Rfl:   •  levothyroxine 150 mcg tablet, TAKE 1 TABLET DAILY, Disp: , Rfl:   •  nebivolol (BYSTOLIC) 5 mg tablet, TAKE 1 TABLET BY ORAL ROUTE EVERY DAY, Disp: , Rfl:   •  nitrofurantoin (MACROBID) 100 mg capsule, Take 1 capsule (100 mg total) by mouth 2 (two) times a day (Patient not taking: Reported on 4/8/2023), Disp: 14 capsule, Rfl: 0  •  omeprazole (PriLOSEC) 20 mg delayed release capsule, Take 20 mg by mouth daily Take before a meal, Disp: , Rfl:     Current Allergies     Allergies as of 04/08/2023 - Reviewed 04/08/2023   Allergen Reaction Noted   • Sulfa antibiotics  11/13/2018            The following portions of the patient's history were reviewed and updated as appropriate: allergies, current medications, past family history, past medical history, past social history, past surgical history and problem list      Past Medical History:   Diagnosis Date   • Disease of thyroid gland    • GERD (gastroesophageal reflux disease)    • Hypertension        Past Surgical History:   Procedure Laterality Date   • HYSTERECTOMY         History reviewed  No pertinent family history  Medications have been verified          Objective   BP (!) 208/100   Pulse 63   Temp 98 °F (36 7 °C)   Ht 5' 8\" (1 727 m)   Wt 99 8 kg (220 lb)   SpO2 97%   BMI 33 45 kg/m²        Physical Exam     Physical Exam  Vitals and nursing note " reviewed  Constitutional:       General: She is not in acute distress  Appearance: Normal appearance  She is not ill-appearing or diaphoretic  Comments: Patient is appears anxious, very hyper in personality   HENT:      Head: Normocephalic and atraumatic  Nose: Nose normal       Mouth/Throat:      Pharynx: Oropharynx is clear  Eyes:      Extraocular Movements: Extraocular movements intact  Pupils: Pupils are equal, round, and reactive to light  Cardiovascular:      Rate and Rhythm: Normal rate and regular rhythm  Pulses: Normal pulses  Heart sounds: Normal heart sounds  No murmur heard  Pulmonary:      Effort: Pulmonary effort is normal  No respiratory distress  Breath sounds: Normal breath sounds  No wheezing or rhonchi  Abdominal:      General: Bowel sounds are normal       Palpations: Abdomen is soft  Tenderness: There is abdominal tenderness (suprapubic tenderness)  There is no right CVA tenderness or left CVA tenderness  Musculoskeletal:         General: Normal range of motion  Cervical back: Normal range of motion  Skin:     General: Skin is warm and dry  Capillary Refill: Capillary refill takes less than 2 seconds  Neurological:      General: No focal deficit present  Mental Status: She is alert and oriented to person, place, and time  Cranial Nerves: No cranial nerve deficit  Sensory: No sensory deficit  Motor: No weakness  Gait: Gait normal    Psychiatric:         Mood and Affect: Mood normal          Behavior: Behavior normal          Thought Content:  Thought content normal          Judgment: Judgment normal

## 2023-04-29 ENCOUNTER — OFFICE VISIT (OUTPATIENT)
Dept: URGENT CARE | Facility: MEDICAL CENTER | Age: 78
End: 2023-04-29

## 2023-04-29 VITALS
OXYGEN SATURATION: 97 % | RESPIRATION RATE: 16 BRPM | HEIGHT: 68 IN | DIASTOLIC BLOOD PRESSURE: 98 MMHG | WEIGHT: 220 LBS | BODY MASS INDEX: 33.34 KG/M2 | HEART RATE: 83 BPM | SYSTOLIC BLOOD PRESSURE: 144 MMHG | TEMPERATURE: 97.7 F

## 2023-04-29 DIAGNOSIS — J00 ACUTE NASOPHARYNGITIS: Primary | ICD-10-CM

## 2023-04-29 RX ORDER — AMLODIPINE BESYLATE 2.5 MG/1
2.5 TABLET ORAL DAILY
COMMUNITY
Start: 2023-04-08

## 2023-04-29 RX ORDER — AZELASTINE 1 MG/ML
1 SPRAY, METERED NASAL 2 TIMES DAILY
Qty: 1 ML | Refills: 0 | Status: SHIPPED | OUTPATIENT
Start: 2023-04-29

## 2023-04-29 RX ORDER — FLUTICASONE PROPIONATE 50 MCG
2 SPRAY, SUSPENSION (ML) NASAL DAILY
Qty: 16 G | Refills: 0 | Status: SHIPPED | OUTPATIENT
Start: 2023-04-29

## 2023-04-29 RX ORDER — BENZONATATE 200 MG/1
200 CAPSULE ORAL 3 TIMES DAILY PRN
Qty: 20 CAPSULE | Refills: 0 | Status: SHIPPED | OUTPATIENT
Start: 2023-04-29

## 2023-04-29 NOTE — PROGRESS NOTES
[facility]        NAME: John Sprague is a 65 y o  female  : 1945    MRN: [id number]  DATE: 2023  TIME: 2:05 PM    Assessment and Plan   Acute nasopharyngitis [J00]  1  Acute nasopharyngitis  COVID Only -Office Collect    azelastine (ASTELIN) 0 1 % nasal spray    benzonatate (TESSALON) 200 MG capsule    fluticasone (FLONASE) 50 mcg/act nasal spray            Patient Instructions     Use medications as directed for symptomatic relief as needed  Motrin and/or Tylenol as needed for any fevers or pain  Follow up with PCP in 3-5 days  Proceed to  ER if symptoms worsen  Chief Complaint     Chief Complaint   Patient presents with   • Cold Like Symptoms     Pt reports non-productive cough, sore throat, laryngitis with onset of symptoms three days ago  States negative at home Covid test this morning  Denies any known fever  Not currently managing symptoms with otc medication  History of Present Illness       -year-old female presents with cough sore throat and raspy voice runny nose congestion  Symptoms started 3 days ago has been waxing and waning  Did a home COVID test today which was negative  Denies any chest pain shortness of breath  No abdominal pain nausea vomiting or diarrhea  Denies any ear pain or eye drainage  Has been having some intermittent headaches fatigue and body aches  Patient reports having COVID in the past approximately over a year ago  URI   This is a new problem  The current episode started in the past 7 days  The problem has been waxing and waning  There has been no fever  Associated symptoms include congestion, coughing, headaches, rhinorrhea and a sore throat  Pertinent negatives include no abdominal pain, chest pain, rash, sinus pain or vomiting  She has tried nothing for the symptoms  The treatment provided no relief  Review of Systems   Review of Systems   Constitutional: Positive for fatigue  Negative for chills and fever     HENT: Positive for congestion, rhinorrhea and sore throat  Negative for sinus pain  Eyes: Negative  Respiratory: Positive for cough  Cardiovascular: Negative  Negative for chest pain  Gastrointestinal: Negative  Negative for abdominal pain and vomiting  Musculoskeletal: Positive for myalgias  Skin: Negative  Negative for rash  Neurological: Positive for headaches  Current Medications       Current Outpatient Medications:   •  amLODIPine (NORVASC) 2 5 mg tablet, Take 2 5 mg by mouth daily, Disp: , Rfl:   •  atorvastatin (LIPITOR) 80 mg tablet, TAKE 1 TABLET BY ORAL ROUTE EVERY DAY, Disp: , Rfl:   •  azelastine (ASTELIN) 0 1 % nasal spray, 1 spray into each nostril 2 (two) times a day Use in each nostril as directed, Disp: 1 mL, Rfl: 0  •  benzonatate (TESSALON) 200 MG capsule, Take 1 capsule (200 mg total) by mouth 3 (three) times a day as needed for cough, Disp: 20 capsule, Rfl: 0  •  fluticasone (FLONASE) 50 mcg/act nasal spray, 2 sprays into each nostril daily, Disp: 16 g, Rfl: 0  •  hydrochlorothiazide (HYDRODIURIL) 25 mg tablet, TAKE 1 TABLET BY ORAL ROUTE EVERY DAY, Disp: , Rfl:   •  levothyroxine 150 mcg tablet, TAKE 1 TABLET DAILY, Disp: , Rfl:   •  nebivolol (BYSTOLIC) 5 mg tablet, TAKE 1 TABLET BY ORAL ROUTE EVERY DAY, Disp: , Rfl:   •  omeprazole (PriLOSEC) 20 mg delayed release capsule, Take 20 mg by mouth daily Take before a meal, Disp: , Rfl:   •  benzonatate (TESSALON) 200 MG capsule, Take 1 capsule (200 mg total) by mouth 3 (three) times a day as needed for cough (Patient not taking: Reported on 4/8/2023), Disp: 20 capsule, Rfl: 0  •  candesartan (ATACAND) 32 MG tablet, TAKE 1 TABLET BY ORAL ROUTE EVERY DAY (Patient not taking: Reported on 4/29/2023), Disp: , Rfl:   •  conjugated estrogens (Premarin) vaginal cream, 1/2 applicator twice weekly at bed time  , Disp: , Rfl:   •  fluticasone (FLONASE) 50 mcg/act nasal spray, SPRAY 2 SPRAYS INTO EACH NOSTRIL EVERY DAY, Disp: 16 mL, Rfl: "0  •  nitrofurantoin (MACROBID) 100 mg capsule, Take 1 capsule (100 mg total) by mouth 2 (two) times a day (Patient not taking: Reported on 4/8/2023), Disp: 14 capsule, Rfl: 0    Current Allergies     Allergies as of 04/29/2023 - Reviewed 04/29/2023   Allergen Reaction Noted   • Sulfa antibiotics  11/13/2018            The following portions of the patient's history were reviewed and updated as appropriate: allergies, current medications, past family history, past medical history, past social history, past surgical history and problem list      Past Medical History:   Diagnosis Date   • Disease of thyroid gland    • GERD (gastroesophageal reflux disease)    • Hypertension        Past Surgical History:   Procedure Laterality Date   • HYSTERECTOMY         History reviewed  No pertinent family history  Medications have been verified  Objective   /98 (BP Location: Right arm, Patient Position: Sitting)   Pulse 83   Temp 97 7 °F (36 5 °C)   Resp 16   Ht 5' 8\" (1 727 m)   Wt 99 8 kg (220 lb)   SpO2 97%   BMI 33 45 kg/m²   No LMP recorded  Patient is postmenopausal        Physical Exam     Physical Exam  Vitals and nursing note reviewed  Constitutional:       General: She is not in acute distress  Appearance: Normal appearance  She is well-developed  HENT:      Head: Normocephalic and atraumatic  Right Ear: Hearing, tympanic membrane, ear canal and external ear normal  There is no impacted cerumen  Left Ear: Hearing, tympanic membrane, ear canal and external ear normal  There is no impacted cerumen  Nose: Congestion and rhinorrhea present  Mouth/Throat:      Pharynx: Uvula midline  Posterior oropharyngeal erythema (Mild) present  No oropharyngeal exudate  Eyes:      General:         Right eye: No discharge  Left eye: No discharge  Conjunctiva/sclera: Conjunctivae normal    Cardiovascular:      Rate and Rhythm: Normal rate and regular rhythm        Heart " sounds: Normal heart sounds  No murmur heard  Pulmonary:      Effort: Pulmonary effort is normal  No respiratory distress  Breath sounds: Normal breath sounds  No wheezing or rales  Abdominal:      General: Bowel sounds are normal       Palpations: Abdomen is soft  Tenderness: There is no abdominal tenderness  Musculoskeletal:         General: Normal range of motion  Cervical back: Normal range of motion and neck supple  Lymphadenopathy:      Cervical: No cervical adenopathy  Skin:     General: Skin is warm and dry  Neurological:      Mental Status: She is alert and oriented to person, place, and time     Psychiatric:         Mood and Affect: Mood normal

## 2023-04-29 NOTE — PATIENT INSTRUCTIONS
Use medications as directed for symptomatic relief as needed  Motrin and/or Tylenol as needed for any fevers or pain  Follow up with PCP in 3-5 days  Proceed to  ER if symptoms worsen  Cold Symptoms   AMBULATORY CARE:   Cold symptoms  include sneezing, dry throat, a stuffy nose, headache, watery eyes, and a cough  Your cough may be dry, or you may cough up mucus  You may also have muscle aches, joint pain, and tiredness  Rarely, you may have a fever  Cold symptoms occur from inflammation in your upper respiratory system caused by a virus  Most colds go away without treatment  Seek care immediately if:   You have increased tiredness and weakness  You are unable to eat  Your heart is beating much faster than usual for you  You see white spots in the back of your throat and your neck is swollen and sore to the touch  You see pinpoint or larger reddish-purple dots on your skin  Contact your healthcare provider if:   You have a fever higher than 102°F (38 9°C)  You have new or worsening shortness of breath  You have thick nasal drainage for more than 2 days  Your symptoms do not improve or get worse within 5 days  You have questions or concerns about your condition or care  Treatment for cold symptoms  may include NSAIDS to decrease muscle aches and fever  Cold medicines may also be given to decrease coughing, nasal stuffiness, sneezing, and a runny nose  Manage your cold symptoms: The following may help relieve cold symptoms, such as a dry throat and congestion:  Gargle with mouthwash or warm salt water as directed  Suck on throat lozenges or hard candy  Use a cold or warm vaporizer or humidifier to ease your breathing  Rest for at least 2 days and then as needed to decrease tiredness and weakness  Use petroleum based jelly around your nostrils to decrease irritation from blowing your nose  Drink plenty of liquids   Liquids will help thin and loosen thick mucus so you can cough it up  Liquids will also keep you hydrated  Ask your healthcare provider which liquids are best for you and how much to drink each day  Prevent the spread of germs  by washing your hands often  You can spread your cold germs to others for at least 3 days after your symptoms start  Do not share items, such as eating utensils  Cover your nose and mouth when you cough or sneeze using the crook of your elbow instead of your hands  Throw used tissues in the garbage  Do not smoke:  Smoking may worsen your symptoms and increase the length of time you feel sick  Talk with your healthcare provider if you need help to stop smoking  Follow up with your doctor as directed:  Write down your questions so you remember to ask them during your visits  © Copyright [employer] 2022 Information is for End User's use only and may not be sold, redistributed or otherwise used for commercial purposes  The above information is an  only  It is not intended as medical advice for individual conditions or treatments  Talk to your doctor, nurse or pharmacist before following any medical regimen to see if it is safe and effective for you

## 2023-05-01 ENCOUNTER — TELEPHONE (OUTPATIENT)
Dept: URGENT CARE | Facility: MEDICAL CENTER | Age: 78
End: 2023-05-01

## 2023-05-01 LAB — SARS-COV-2 RNA RESP QL NAA+PROBE: NEGATIVE

## 2023-05-02 ENCOUNTER — TELEPHONE (OUTPATIENT)
Dept: URGENT CARE | Age: 78
End: 2023-05-02

## 2023-05-02 NOTE — TELEPHONE ENCOUNTER
Spoke with patient about lab results  Negative for COVID  Patient reports she understands  Reports since visit symptoms have been gradually getting better  Patient had no further questions

## 2023-08-22 DIAGNOSIS — J00 ACUTE NASOPHARYNGITIS: ICD-10-CM

## 2023-08-25 RX ORDER — AZELASTINE HYDROCHLORIDE 137 UG/1
SPRAY, METERED NASAL
OUTPATIENT
Start: 2023-08-25

## 2023-10-19 ENCOUNTER — OFFICE VISIT (OUTPATIENT)
Dept: URGENT CARE | Facility: MEDICAL CENTER | Age: 78
End: 2023-10-19
Payer: COMMERCIAL

## 2023-10-19 VITALS
RESPIRATION RATE: 20 BRPM | TEMPERATURE: 98.6 F | SYSTOLIC BLOOD PRESSURE: 158 MMHG | HEART RATE: 71 BPM | DIASTOLIC BLOOD PRESSURE: 96 MMHG | OXYGEN SATURATION: 98 %

## 2023-10-19 DIAGNOSIS — J02.9 SORE THROAT: ICD-10-CM

## 2023-10-19 DIAGNOSIS — J06.9 VIRAL URI WITH COUGH: Primary | ICD-10-CM

## 2023-10-19 LAB
S PYO AG THROAT QL: NEGATIVE
SARS-COV-2 AG UPPER RESP QL IA: NEGATIVE
VALID CONTROL: NORMAL

## 2023-10-19 PROCEDURE — 87811 SARS-COV-2 COVID19 W/OPTIC: CPT

## 2023-10-19 PROCEDURE — 87880 STREP A ASSAY W/OPTIC: CPT

## 2023-10-19 PROCEDURE — 99213 OFFICE O/P EST LOW 20 MIN: CPT

## 2023-10-19 NOTE — PROGRESS NOTES
[facility]        NAME: David Bowden is a 65 y.o. female  : 1945    MRN: [id number]  DATE: 2023  TIME: 7:21 PM    Assessment and Plan   Viral URI with cough [J06.9]  1. Viral URI with cough        2. Sore throat  POCT rapid strepA    Poct Covid 19 Rapid Antigen Test        Rapid strep test: Negative. Rapid COVID test: Negative. Symptoms correlate with viral URI. Advised symptomatic treatment. Patient Instructions     Your symptoms are likely due to a viral illness. Your rapid strep and COVID tests were negative. The mainstay of treatment is for symptom relief, see below for recommended medications. Fever/Body Aches: We recommend you take 600mg ibuprofen every 6 hours or tylenol 650mg every 6 hours as needed for fever. If needed, you can alternate these medications so that you take one medication every 3 hours. For instance, at noon take ibuprofen, then at 3pm take tylenol, then at 6pm take ibuprofen. Cough: Delsym, an over the counter cough medication may be used every 12 hours as needed. Mucinex XR (guafenisen) 600 mg tablets may be used to thin out the mucous to make it easier to cough up. You may take 1-2 tablets twice per day as needed. Utilizing a vaporizer/humidifier may help, as well as increasing fluids. Sore Throat: Salt water gargles with 1 teaspoon of salt dissolved in 6-8 oz of water as needed can help with a sore throat, as can honey, drinking plenty of liquids, eating soft foods. If severe, can utilize OTC chloraseptic spray. Nasal Congestion: Over the counter allergy medication like Claritin, Allegra or Zyrtec can help with nasal congestion and post nasal drip. Over the counter saline or steroid nasal sprays like Flonase can help with nasal congestion and post nasal drip as well. Over the counter decongestants such as Sudafed may also help. Upset Stomach: Drink plenty of fluids.  May utilize Gatorade, Pedialyte, or other electrolyte solutions to supplement. Eat bland foods (ex: BRAT - bananas, rice, applesauce, toast) and slowly advance to other foods as tolerated. Please note, if you have heart issues or high blood pressure, the cough/cold medication of choice is Coricidin. Talk to your doctor before trying any new medications. Follow up with PCP in 3-5 days. Proceed to  ER if symptoms worsen. Chief Complaint     Chief Complaint   Patient presents with    Sore Throat     Pt c/o sore throat and runny nose since yesterday - concerned she may have covid - her daughter is coming to visit fro [place]         History of Present Illness       Sore Throat   This is a new problem. The current episode started yesterday. The problem has been gradually worsening. Neither side of throat is experiencing more pain than the other. There has been no fever. The pain is at a severity of 4/10. Associated symptoms include congestion, coughing (dry) and a hoarse voice. Pertinent negatives include no abdominal pain, ear discharge, ear pain, headaches, shortness of breath, swollen glands or vomiting. Exposure to: COVID - 6 days ago. Treatments tried: Flonase, leftover doxycycline. The treatment provided moderate relief. Review of Systems   Review of Systems   Constitutional:  Negative for appetite change, chills and fever. HENT:  Positive for congestion, hoarse voice, rhinorrhea, sinus pressure and sore throat. Negative for ear discharge, ear pain, postnasal drip and sinus pain. Eyes:  Negative for pain, discharge, redness and itching. Respiratory:  Positive for cough (dry). Negative for chest tightness, shortness of breath and wheezing. Gastrointestinal:  Negative for abdominal pain, nausea and vomiting. Musculoskeletal:  Negative for myalgias. Skin:  Negative for rash. Neurological:  Negative for headaches.          Current Medications       Current Outpatient Medications:     amLODIPine (NORVASC) 2.5 mg tablet, Take 2.5 mg by mouth daily, Disp: , Rfl: atorvastatin (LIPITOR) 80 mg tablet, TAKE 1 TABLET BY ORAL ROUTE EVERY DAY, Disp: , Rfl:     azelastine (ASTELIN) 0.1 % nasal spray, 1 spray into each nostril 2 (two) times a day Use in each nostril as directed, Disp: 1 mL, Rfl: 0    candesartan (ATACAND) 32 MG tablet, , Disp: , Rfl:     conjugated estrogens (Premarin) vaginal cream, 1/2 applicator twice weekly at bed time. , Disp: , Rfl:     fluticasone (FLONASE) 50 mcg/act nasal spray, 2 sprays into each nostril daily, Disp: 16 g, Rfl: 0    hydrochlorothiazide (HYDRODIURIL) 25 mg tablet, TAKE 1 TABLET BY ORAL ROUTE EVERY DAY, Disp: , Rfl:     levothyroxine 150 mcg tablet, TAKE 1 TABLET DAILY, Disp: , Rfl:     nebivolol (BYSTOLIC) 5 mg tablet, TAKE 1 TABLET BY ORAL ROUTE EVERY DAY, Disp: , Rfl:     omeprazole (PriLOSEC) 20 mg delayed release capsule, Take 20 mg by mouth daily Take before a meal, Disp: , Rfl:     benzonatate (TESSALON) 200 MG capsule, Take 1 capsule (200 mg total) by mouth 3 (three) times a day as needed for cough (Patient not taking: Reported on 4/8/2023), Disp: 20 capsule, Rfl: 0    benzonatate (TESSALON) 200 MG capsule, Take 1 capsule (200 mg total) by mouth 3 (three) times a day as needed for cough (Patient not taking: Reported on 10/19/2023), Disp: 20 capsule, Rfl: 0    fluticasone (FLONASE) 50 mcg/act nasal spray, SPRAY 2 SPRAYS INTO EACH NOSTRIL EVERY DAY (Patient not taking: Reported on 10/19/2023), Disp: 16 mL, Rfl: 0    nitrofurantoin (MACROBID) 100 mg capsule, Take 1 capsule (100 mg total) by mouth 2 (two) times a day (Patient not taking: Reported on 4/8/2023), Disp: 14 capsule, Rfl: 0    Current Allergies     Allergies as of 10/19/2023 - Reviewed 10/19/2023   Allergen Reaction Noted    Sulfa antibiotics  11/13/2018            The following portions of the patient's history were reviewed and updated as appropriate: allergies, current medications, past family history, past medical history, past social history, past surgical history and problem list.     Past Medical History:   Diagnosis Date    Disease of thyroid gland     GERD (gastroesophageal reflux disease)     Hypertension        Past Surgical History:   Procedure Laterality Date    HYSTERECTOMY         No family history on file. Medications have been verified. Objective   /96   Pulse 71   Temp 98.6 °F (37 °C) (Tympanic)   Resp 20   SpO2 98%        Physical Exam     Physical Exam  Vitals and nursing note reviewed. Constitutional:       General: She is not in acute distress. Appearance: Normal appearance. She is not ill-appearing. HENT:      Right Ear: Tympanic membrane, ear canal and external ear normal.      Left Ear: Tympanic membrane, ear canal and external ear normal.      Nose: Congestion and rhinorrhea present. Mouth/Throat:      Mouth: Mucous membranes are moist.      Pharynx: Posterior oropharyngeal erythema present. No oropharyngeal exudate. Eyes:      General:         Right eye: No discharge. Left eye: No discharge. Extraocular Movements: Extraocular movements intact. Cardiovascular:      Rate and Rhythm: Normal rate and regular rhythm. Pulses: Normal pulses. Heart sounds: Normal heart sounds. Pulmonary:      Effort: Pulmonary effort is normal. No respiratory distress. Breath sounds: Normal breath sounds. No wheezing, rhonchi or rales. Abdominal:      General: Abdomen is flat. Bowel sounds are normal. There is no distension. Palpations: Abdomen is soft. Tenderness: There is no abdominal tenderness. There is no guarding. Musculoskeletal:      Cervical back: Neck supple. No tenderness. Lymphadenopathy:      Cervical: No cervical adenopathy. Skin:     General: Skin is warm and dry. Neurological:      Mental Status: She is alert.

## 2023-10-19 NOTE — PATIENT INSTRUCTIONS
Your symptoms are likely due to a viral illness. Your rapid strep and COVID tests were negative. The mainstay of treatment is for symptom relief, see below for recommended medications. Fever/Body Aches: We recommend you take 600mg ibuprofen every 6 hours or tylenol 650mg every 6 hours as needed for fever. If needed, you can alternate these medications so that you take one medication every 3 hours. For instance, at noon take ibuprofen, then at 3pm take tylenol, then at 6pm take ibuprofen. Cough: Delsym, an over the counter cough medication may be used every 12 hours as needed. Mucinex XR (guafenisen) 600 mg tablets may be used to thin out the mucous to make it easier to cough up. You may take 1-2 tablets twice per day as needed. Utilizing a vaporizer/humidifier may help, as well as increasing fluids. Sore Throat: Salt water gargles with 1 teaspoon of salt dissolved in 6-8 oz of water as needed can help with a sore throat, as can honey, drinking plenty of liquids, eating soft foods. If severe, can utilize OTC chloraseptic spray. Nasal Congestion: Over the counter allergy medication like Claritin, Allegra or Zyrtec can help with nasal congestion and post nasal drip. Over the counter saline or steroid nasal sprays like Flonase can help with nasal congestion and post nasal drip as well. Over the counter decongestants such as Sudafed may also help. Upset Stomach: Drink plenty of fluids. May utilize Gatorade, Pedialyte, or other electrolyte solutions to supplement. Eat bland foods (ex: BRAT - bananas, rice, applesauce, toast) and slowly advance to other foods as tolerated. Please note, if you have heart issues or high blood pressure, the cough/cold medication of choice is Coricidin. Talk to your doctor before trying any new medications. Follow up with PCP in 3-5 days. Proceed to  ER if symptoms worsen. Upper Respiratory Infection   AMBULATORY CARE:   An upper respiratory infection  is also called a cold. Your nose, throat, ears, and sinuses may be affected. You are more likely to get a cold in the winter. Your risk of getting a cold may be increased if you smoke cigarettes or have allergies, such as hay fever. What causes a cold? A cold is caused by a virus. Many viruses can cause a cold, and each is contagious. This means the virus can be easily spread to another person when the sick person coughs or sneezes. The virus can also be spread if you touch an object the virus is on and then touch your eyes, mouth, or nose. Cold symptoms  are usually worst for the first 3 to 5 days. You may have any of the following:  Runny or stuffy nose    Sneezing and coughing    Sore throat or hoarseness    Red, watery, and sore eyes    Fatigue (you feel more tired than usual)    Chills and fever    Headache, body aches, or sore muscles    Call your local emergency number (911 in the [address]) if:   You have chest pain or trouble breathing. Seek care immediately if:   You have a fever over 102ºF (39ºC). Call your doctor if:   You have a low fever. Your sore throat gets worse or you see white or yellow spots in your throat. Your symptoms get worse after 3 to 5 days or are not better in 14 days. You have a rash anywhere on your skin. You have large, tender lumps in your neck. You have thick, green, or yellow drainage from your nose. You cough up thick yellow, green, or bloody mucus. You have a bad earache. You have questions or concerns about your condition or care. Treatment:  Colds are caused by viruses and do not get better with antibiotics. Most people get better in 7 to 14 days. You may continue to cough for 2 to 3 weeks. The following may help decrease your symptoms:  Decongestants  help reduce nasal congestion and help you breathe more easily. If you take decongestant pills, they may make you feel restless or not able to sleep. Do not use decongestant sprays for more than a few days.     Cough suppressants  help reduce coughing. Ask your healthcare provider which type of cough medicine is best for you. NSAIDs , such as ibuprofen, help decrease swelling, pain, and fever. NSAIDs can cause stomach bleeding or kidney problems in certain people. If you take blood thinner medicine, always ask your healthcare provider if NSAIDs are safe for you. Always read the medicine label and follow directions. Acetaminophen  decreases pain and fever. It is available without a doctor's order. Ask how much to take and how often to take it. Follow directions. Read the labels of all other medicines you are using to see if they also contain acetaminophen, or ask your doctor or pharmacist. Acetaminophen can cause liver damage if not taken correctly. Manage a cold:   Rest as much as possible. Slowly start to do more each day. Drink more liquids as directed. Liquids will help thin and loosen mucus so you can cough it up. Liquids will also help prevent dehydration. Liquids that help prevent dehydration include water, fruit juice, and broth. Do not drink liquids that contain caffeine. Caffeine can increase your risk for dehydration. Ask your healthcare provider how much liquid to drink each day. Soothe a sore throat. Gargle with warm salt water. Make salt water by dissolving ¼ teaspoon salt in 1 cup warm water. You may also suck on hard candy or throat lozenges. You may use a sore throat spray. Use a humidifier or vaporizer. Use a cool mist humidifier or a vaporizer to increase air moisture in your home. This may make it easier for you to breathe and help decrease your cough. Use saline nasal drops as directed. These help relieve congestion. Apply petroleum-based jelly around the outside of your nostrils. This can decrease irritation from blowing your nose. Do not smoke. Nicotine and other chemicals in cigarettes and cigars can make your symptoms worse.  They can also cause infections such as bronchitis or pneumonia. Ask your healthcare provider for information if you currently smoke and need help to quit. E-cigarettes or smokeless tobacco still contain nicotine. Talk to your healthcare provider before you use these products. Prevent a cold: Wash your hands often. Use soap and water every time you wash your hands. Rub your soapy hands together, lacing your fingers. Use the fingers of one hand to scrub under the nails of the other hand. Wash for at least 20 seconds. Rinse with warm, running water for several seconds. Then dry your hands. Use hand  gel if soap and water are not available. Do not touch your eyes or mouth without washing your hands first.         Cover a sneeze or cough. Use a tissue that covers your mouth and nose. Put the used tissue in the trash right away. Use the bend of your arm if a tissue is not available. Wash your hands well with soap and water or use a hand . Do not stand close to anyone who is sneezing or coughing. Try to stay away from others while you are sick. This is especially important during the first 2 to 3 days when the virus is more easily spread. Wait until a fever, cough, or other symptoms are gone before you return to work or other regular activities. Do not share items while you are sick. This includes food, drinks, eating utensils, and dishes. Follow up with your doctor as directed:  Write down your questions so you remember to ask them during your visits. © Copyright [employer] 2023 Information is for End User's use only and may not be sold, redistributed or otherwise used for commercial purposes. The above information is an  only. It is not intended as medical advice for individual conditions or treatments. Talk to your doctor, nurse or pharmacist before following any medical regimen to see if it is safe and effective for you.

## 2023-12-02 NOTE — PATIENT INSTRUCTIONS
DISCHARGE SUMMARY    Name: Felipe Casanova  Age: 79 year old  Sex: female  MRN: [id number]    GENERAL INFORMATION  - Admission Date/Time: 11/24/2023 12:20 PM  - Discharge Date/Time: 12/2/2023  -Disposition: Home with Hospice care  -Code Status: Full DNR       - PCP: Kristi Sawyer MD.  Notified via PS     IP Consult Orders (From admission, onward)     Start     Ordered    11/25/23 0852  Inpatient consult to Palliative Care  ONE TIME        Provider:  Huong Ravi CNP    11/25/23 0851    11/25/23 0849  Inpatient consult to Pulmonology  ONE TIME        Provider:  Gaurav Christianson MD    11/25/23 0851    11/24/23 2231  Inpatient consult to Oncology  ONE TIME        Provider:  Roxy Taveras MD    11/24/23 2230 11/24/23 1508  Inpatient consult to Nephrology  ONE TIME        Provider:  Claudio Haynes MD    11/24/23 1508                HOSPITAL COURSE AND DISCHARGE DIAGNOSIS   79y F with metastatic cholangiocarcinoma with metastases to liver, CKD stage III, HFpEF, hypertension recently admitted earlier this month for respiratory failure from CHF exacerbation complicated by A-fib and hyponatremia now presenting with fevers     Sepsis likely due to UTI as she complained of dysuria, frequency and hesitancy that have now resolved 11/25- also possible PNA   -However xray abnormal and worse looking than previous; today states she is sob and continues to be on oxygen-CT noted   -Pul consult obtained, plan for thoracentesis today and possible bronchoscopy later   - blood cx negative to date  -Continue zosyn/azithromycin  -Continue to monitor      Acute hypoxemic resp failure  Abnormal cxr  Possible PNA   Pleural effusions transudative 2ndary to CHF   -Likely 2/2 pleural effusion,possible  Pna, mucous plugging and HF   -CT chest 11/26: increased moderate rt pl effusion, stable left pl effusion, heterogenous consolidation RLL, central RUL, may represent atelectasis  Vs infection, increase in nodular densities in  Finger Laceration   WHAT YOU NEED TO KNOW:   A finger laceration is a deep cut in your skin  It is often caused by a sharp object, such as a knife, or blunt force to your finger  Your blood vessels, bones, joints, tendons, or nerves may also be injured  DISCHARGE INSTRUCTIONS:   Return to the emergency department if:   · Your wound comes apart  · Blood soaks through your bandage  · You have severe pain in your finger or hand  · Your finger is pale and cold  · You have sudden trouble moving your finger  · Your swelling suddenly gets worse  · You have red streaks on your skin coming from your wound  Contact your healthcare provider or hand specialist if:   · You have new numbness or tingling  · Your finger feels warm, looks swollen or red, and is draining pus  · You have a fever  · You have questions or concerns about your condition or care  Medicines: You may  need any of the following:  · Antibiotics  help prevent a bacterial infection  · Acetaminophen  decreases pain and fever  It is available without a doctor's order  Ask how much to take and how often to take it  Follow directions  Read the labels of all other medicines you are using to see if they also contain acetaminophen, or ask your doctor or pharmacist  Acetaminophen can cause liver damage if not taken correctly  Do not use more than 4 grams (4,000 milligrams) total of acetaminophen in one day  · Prescription pain medicine  may be given  Ask your healthcare provider how to take this medicine safely  Some prescription pain medicines contain acetaminophen  Do not take other medicines that contain acetaminophen without talking to your healthcare provider  Too much acetaminophen may cause liver damage  Prescription pain medicine may cause constipation  Ask your healthcare provider how to prevent or treat constipation  · Take your medicine as directed    Contact your healthcare provider if you think your medicine is not helping or if you have side effects  Tell him or her if you are allergic to any medicine  Keep a list of the medicines, vitamins, and herbs you take  Include the amounts, and when and why you take them  Bring the list or the pill bottles to follow-up visits  Carry your medicine list with you in case of an emergency  Self-care:   · Apply ice  on your finger for 15 to 20 minutes every hour or as directed  Use an ice pack, or put crushed ice in a plastic bag  Cover it with a towel before you apply it to your skin  Ice helps prevent tissue damage and decreases swelling and pain  · Elevate  your hand above the level of your heart as often as you can  This will help decrease swelling and pain  Prop your hand on pillows or blankets to keep it elevated comfortably  · Wear your splint as directed  A splint will decrease movement and stress on your wound  The splint may help your wound heal faster  Ask your healthcare provider how to apply and remove a splint  · Apply ointments to decrease scarring  Do not apply ointments until your healthcare provider says it is okay  You may need to wait until your wound is healed  Ask which ointment to buy and how often to use it  Wound care:   · Do not get your wound wet until your healthcare provider says it is okay  Do not soak your hand in water  Do not go swimming until your healthcare provider says it is okay  When your healthcare provider says it is okay, carefully wash around the wound with soap and water  Let soap and water run over your wound  Gently pat the area dry or allow it to air dry  · Change your bandages when they get wet, dirty, or after washing  Apply new, clean bandages as directed  Do not apply elastic bandages or tape too tightly  Do not put powders or lotions on your wound  · Apply antibiotic ointment as directed  Your healthcare provider may give you antibiotic ointment to put over your wound if you have stitches   If you ruben UL favouring inflammatory process, layering mucus secretions in ruben mainstem bronchi with endobronchial filling in LL bronchus   -TTE 10/2023: EF 52%, G2 DD   -Continue supplemental oxygen  -Pulmonary toileting  -s/p Right thoracocentesis 11/28 with aspiration of 700 ml of clear yellow fluid, transudate, Cytology pending   -no plan for bronchoscopy as per pulmonary  -Continue azithromycin/ zosyn   -s/p iv lasix   -Home o2 eval prior to discharge   -Abd US: 1.   Kidneys are mildly echogenic which can be seen medical renal disease.  No hydronephrosis.2.   Cirrhotic morphology of the liver with splenomegaly and trace  abdominal ascites in the upper abdomen.3.   A 4.3 cm masslike area in the right hepatic lobe representing the prior microwave ablation site. Evaluation for residual disease is  suboptimal on this modality. 4.   Small bilateral pleural effusions.     Hyponatremia  GIUSEPPE on CKD stage III  Baseline cr 1.2 to 1.4  Cr worsening 2.89   -Renal on consult managing   -patient chose no to dailysis   -d/w renal, possible ATN, iv l;asix x1 dose  -bladder scan no retention 11/29  -worsening renal functions, oliguric   -Renal us no hydro     Dm type II  -Home regimen tresiba 24 units daily, Novolg 24 units with 1st meal and 13 units with last meal   -on lantus and LDSSI   -     Acute on chronic HFpEF  Recently diagnosed atrial fibrillation-now bradycardic   HTN  Prior echo EF 52%  Metoprolol 25 decrease dose of metoprolol to 12.5  given bradycardia  Continue hydralazine   Started norvasc; monitor bp and uptitrate medications as needed   Holding  Po lasix and SGLT2i  Continue low dose apixaban     Metastatic cholangiocarcinoma with liver mets  Currently on durvalumab + cisplatin and gemcitabine, however family recently decided to hold most recent cycle  -f/u with   -- 11/28  Met Hosp[facility], family wants to d/w with oncology prior to making a decision about continuing immunotherapy vs Hospice, agreed to home  have Strips-Strips over your wound, let them dry up and fall off on their own  If they do not fall off within 14 days, gently remove them  If you have glue over your wound, do not remove or pick at it  If your glue comes off, do not replace it with glue that you have at home  · Check your wound every day for signs of infection  Signs of infection include swelling, redness, or pus  Follow up with your healthcare provider or hand specialist in 2 days:  Write down your questions so you remember to ask them during your visits  © 2017 [employer]  Information is for End User's use only and may not be sold, redistributed or otherwise used for commercial purposes  All illustrations and images included in CareNotes® are the copyrighted property of [employer] , [employer]  or [employer]  The above information is an  only  It is not intended as medical advice for individual conditions or treatments  Talk to your doctor, nurse or pharmacist before following any medical regimen to see if it is safe and effective for you  palliative care, notified oncology, Home palliative care ordered, oncology recommendations reviewed-most recent PET CT scan dated 11/16/2023 which revealed a good response and suggested that the patient keep the option for further treatment open.  However, if her renal function deteriorates further, that may not be possible   -poor prognosis, d/w pt, daughters, POA  -Abd US ordered to r/o ascitis 11/29: Abd US: 1.   Kidneys are mildly echogenic which can be seen medical renal disease.  No hydronephrosis.2.   Cirrhotic morphology of the liver with splenomegaly and trace  abdominal ascites in the upper abdomen.3.   A 4.3 cm masslike area in the right hepatic lobe representing the prior microwave ablation site. Evaluation for residual disease is  suboptimal on this modality. 4.   Small bilateral pleural effusions.  - 11/30 patient/family leaning [facility], wants to meet palliative care, palliative care meeting this afternoon  -12/1 family junior [facility]   Poor prognosis   -d/w oncology     -     History of CVA  Continue statin, recently stopped Plavix due to anticoagulation     Hypothyroidism  Resume levothyroxine     Depression  Resume Lexapro     Severe protein calorie malnutrition  -Diet as tolerated       Dispo home with palliativecare in 1-2 days                Discharge Medications     Summary of your Discharge Medications      Take these Medications      Details   acetaminophen 325 MG tablet  Commonly known as: TYLENOL   Take 2 tablets by mouth every 4 hours as needed for Pain.     HYDROcodone-acetaminophen  MG per tablet  Commonly known as: Norco   Take 1 tablet by mouth every 6 hours as needed for Pain.     ipratropium-albuterol 0.5-2.5 (3) MG/3ML nebulizer solution  Commonly known as: DUONEB   Take 3 mLs by nebulization three times daily.     prochlorperazine 10 MG tablet  Commonly known as: COMPAZINE   Take 1 tablet by mouth every 6 hours as needed for Nausea or Vomiting.           Lethargic, edematous   On NC   Vitals  Visit Vitals  /49 (BP Location: RFA - Right forearm, Patient Position: Semi-Hill's)   Pulse 84   Temp 100 °F (37.8 °C) (Axillary)   Resp (!) 20   Ht 4' 9\" (1.448 m)   Wt 73.3 kg (161 lb 9.6 oz)   SpO2 98%   BMI 34.97 kg/m²        Physical Exam  General: lethargic  Not in acute distress.    Head:  Normocephalic-atraumatic.   Neck:  Trachea is midline. No adenopathy.  Normal thyroid without mass or tenderness..  Eyes:  Normal conjunctivae and sclerae.  Pupils equal, round, reactive to light.  Extraocular movements intact.    ENT:   Mucous membranes are moist.    Cardiovascular:  Symmetrical pulses.  Regular rate and rhythm without murmur.  Respiratory:   Normal respiratory effort.  Clear to auscultation.  No wheeze, bibasal creps   Gastrointestinal:  Soft and nontender.  Normal bowel sounds.    Skin:  Warm and dry without rash.  Musculoskeletal:  No deformity or edema.  No tenderness to palpation.    Extremities: 1-2+ edema both UE and LE   Back:   Normal alignment.  No costovertebral angle tenderness or midline bony tenderness.  Neurologic:   Lethargic    No focal deficits.  Psychiatric:   Cooperative.  Appropriate mood and affect.  Normal judgment      LAB RESULTS  Recent Results (from the past 24 hour(s))   GLUCOSE, BEDSIDE - POINT OF CARE    Collection Time: 12/01/23  5:24 PM   Result Value Ref Range    GLUCOSE, BEDSIDE - POINT OF CARE 255 (H) 70 - 99 mg/dL   Basic Metabolic Panel    Collection Time: 12/02/23  4:08 AM   Result Value Ref Range    Fasting Status      Sodium 127 (L) 135 - 145 mmol/L    Potassium 3.9 3.4 - 5.1 mmol/L    Chloride 95 (L) 97 - 110 mmol/L    Carbon Dioxide 21 21 - 32 mmol/L    Anion Gap 15 7 - 19 mmol/L    Glucose 148 (H) 70 - 99 mg/dL    BUN 54 (H) 6 - 20 mg/dL    Creatinine 4.42 (H) 0.51 - 0.95 mg/dL    Glomerular Filtration Rate 10 (L) >=60    BUN/Cr 12 7 - 25    Calcium 8.2 (L) 8.4 - 10.2 mg/dL   GLUCOSE, BEDSIDE - POINT OF CARE     Collection Time: 12/02/23  7:55 AM   Result Value Ref Range    GLUCOSE, BEDSIDE - POINT OF CARE 132 (H) 70 - 99 mg/dL       Imaging  US ABDOMEN COMPLETE   Final Result      1.   Kidneys are mildly echogenic which can be seen medical renal disease.   No hydronephrosis.   2.   Cirrhotic morphology of the liver with splenomegaly and trace   abdominal ascites in the upper abdomen.   3.   A 4.3 cm masslike area in the right hepatic lobe representing the   prior microwave ablation site. Evaluation for residual disease is   suboptimal on this modality.   4.   Small bilateral pleural effusions.               Electronically Signed by: AMY MEDINA MD    Signed on: 11/30/2023 12:02 PM    Workstation ID: [id number]      XR CHEST AP OR PA   Final Result   1.   Mild central congestion. Perihilar and lower lobe interstitial   opacities. Bibasilar atelectasis.       Electronically Signed by: QAMAR DHALIWAL MD    Signed on: 11/29/2023 6:57 PM    Workstation ID: [id number]      XR CHEST AP OR PA   Final Result      Patchy left lung airspace consolidation may reflect underlying infection.      Small left pleural effusion.      No pneumothorax.         Electronically Signed by: RADHA DURANT M.D.    Signed on: 11/27/2023 3:30 PM    Workstation ID: [id number]      IR THORACENTESIS WITH IMAGING   Final Result   Impression: Successful and uneventful ultrasound guided thoracentesis.      Electronically Signed by: WILBERTO CABRERA MD    Signed on: 11/28/2023 9:20 AM    Workstation ID: [id number]      CT CHEST WO CONTRAST   Final Result      1.   Increase in size of a moderate right pleural effusion and stable left   effusion. Heterogeneous consolidation in the right lower lobe and central   right upper lobe may represent atelectasis and/or infection. Increase in   nodular densities in the bilateral upper lobes favoring an inflammatory   process. Metastasis are not excluded.   2.   Layering mucus secretions in the bilateral  mainstem bronchi with   endobronchial filling in lower lobe bronchus.   3.   Stable microwave ablation changes in the liver. Stable right   cardiophrenic lymphadenopathy.            Electronically Signed by: AMY MEDINA MD    Signed on: 11/26/2023 10:49 AM    Workstation ID: [id number]      XR CHEST AP OR PA   Final Result   FINDINGS\IMPRESSION:      SINCE PRIOR COMPARISON EXAMINATION(S) , THE FOLLOWING INTERVAL   CHANGE/OBSERVATIONS NOTED:   1.   Worsening bilateral perihilar and basilar interstitial airspace   opacities.   2.   Elevation of the right hemidiaphragm scheduled   3.   There is no identifiable pneumothorax/pneumomediastinum.   4.   Portacatheter from the right approach terminates in the proximal   superior vena cava                  Electronically Signed by: NEIDA SEGURA MD    Signed on: 11/24/2023 1:42 PM    Workstation ID: [id number]          DISCHARGE INSTRUCTIONS  Conditions of patient on discharge: Stable  Activity: As tolerated    [facility]       Time spent on discharge  more than 35 minutes        Christina Shaver MD   Internal Medicine  12/2/2023 12:58 PM

## 2024-07-06 ENCOUNTER — OFFICE VISIT (OUTPATIENT)
Dept: URGENT CARE | Facility: MEDICAL CENTER | Age: 79
End: 2024-07-06
Payer: COMMERCIAL

## 2024-07-06 VITALS
OXYGEN SATURATION: 99 % | HEART RATE: 77 BPM | RESPIRATION RATE: 18 BRPM | TEMPERATURE: 98.1 F | DIASTOLIC BLOOD PRESSURE: 82 MMHG | SYSTOLIC BLOOD PRESSURE: 148 MMHG

## 2024-07-06 DIAGNOSIS — N30.01 ACUTE CYSTITIS WITH HEMATURIA: Primary | ICD-10-CM

## 2024-07-06 DIAGNOSIS — R30.0 BURNING WITH URINATION: ICD-10-CM

## 2024-07-06 LAB
SL AMB  POCT GLUCOSE, UA: ABNORMAL
SL AMB LEUKOCYTE ESTERASE,UA: ABNORMAL
SL AMB POCT BILIRUBIN,UA: ABNORMAL
SL AMB POCT BLOOD,UA: ABNORMAL
SL AMB POCT CLARITY,UA: ABNORMAL
SL AMB POCT COLOR,UA: ABNORMAL
SL AMB POCT KETONES,UA: 15
SL AMB POCT NITRITE,UA: ABNORMAL
SL AMB POCT PH,UA: 5
SL AMB POCT SPECIFIC GRAVITY,UA: 1.01
SL AMB POCT URINE PROTEIN: ABNORMAL
SL AMB POCT UROBILINOGEN: 1

## 2024-07-06 PROCEDURE — 99214 OFFICE O/P EST MOD 30 MIN: CPT

## 2024-07-06 PROCEDURE — 87086 URINE CULTURE/COLONY COUNT: CPT

## 2024-07-06 RX ORDER — LOSARTAN POTASSIUM AND HYDROCHLOROTHIAZIDE 12.5; 1 MG/1; MG/1
1 TABLET ORAL DAILY
COMMUNITY

## 2024-07-06 RX ORDER — FLUCONAZOLE 150 MG/1
TABLET ORAL
COMMUNITY

## 2024-07-06 RX ORDER — CLOBETASOL PROPIONATE 0.5 MG/G
CREAM TOPICAL
COMMUNITY
Start: 2024-03-29

## 2024-07-06 RX ORDER — CEPHALEXIN 500 MG/1
500 CAPSULE ORAL EVERY 12 HOURS SCHEDULED
Qty: 14 CAPSULE | Refills: 0 | Status: SHIPPED | OUTPATIENT
Start: 2024-07-06 | End: 2024-07-13

## 2024-07-06 RX ORDER — LEVOFLOXACIN 500 MG/1
500 TABLET, FILM COATED ORAL DAILY
COMMUNITY
Start: 2024-05-03

## 2024-07-06 RX ORDER — ICOSAPENT ETHYL 1 G/1
2 CAPSULE ORAL 2 TIMES DAILY
COMMUNITY
Start: 2024-06-17

## 2024-07-06 RX ORDER — FLUCONAZOLE 100 MG/1
100 TABLET ORAL DAILY
COMMUNITY

## 2024-07-06 RX ORDER — LOSARTAN POTASSIUM 100 MG/1
100 TABLET ORAL DAILY
COMMUNITY
Start: 2024-06-24

## 2024-07-06 RX ORDER — NYSTATIN 100000 [USP'U]/G
POWDER TOPICAL
COMMUNITY
Start: 2024-03-29

## 2024-07-06 NOTE — PATIENT INSTRUCTIONS
"Urine dip showed signs of potential UTI. Prescribed antibiotics, use as directed. Will send culture out to determine bacteria causing infection and susceptibility to antibiotics. May try over the counter Azo to decrease burning with urination, over the counter Tylenol or ibuprofen as directed on packaging as needed for pain. Drink plenty of fluids and follow proper urinary hygiene.    Follow up with PCP in 3-5 days.  Proceed to  ER if symptoms worsen.    If tests are performed, our office will contact you with results only if changes need to made to the care plan discussed with you at the visit. You can review your full results on [facility].    Patient Education     Urinary tract infections in adults   The Basics   Written by the doctors and editors at [facility]   What is the urinary tract? -- The urinary tract is the group of organs in the body that handle urine (figure 1). The urinary tract includes the:   Kidneys - These are 2 bean-shaped organs that filter the blood to make urine.   Bladder - This is a balloon-shaped organ that stores urine.   Ureters - These are 2 tubes that carry urine from the kidneys to the bladder.   Urethra - This is the tube that carries urine from the bladder to the outside of the body.  What are urinary tract infections? -- Urinary tract infections (\"UTIs\") are infections that affect either the bladder or the kidneys:   Bladder infections are more common than kidney infections. They happen when bacteria get into the urethra and travel up into the bladder. The medical term for bladder infection is \"cystitis.\" Most of the time, when people talk about a UTI, they mean a bladder infection.   Kidney infections happen when the bacteria travel even higher, up into the kidneys. The medical term for kidney infection is \"pyelonephritis.\" This is more serious than a bladder infection, and can lead to other serious problems if it is not treated properly.  Both bladder and kidney infections " are more common in females than males.  The risk of UTIs is also higher in people who have a urinary catheter. A catheter is a thin, flexible tube that drains urine from the bladder. It might be used in people who are in the hospital and cannot urinate the normal way.  What are the symptoms of a bladder infection? -- The symptoms include:   Pain or a burning feeling when you urinate   The need to urinate often   The need to urinate suddenly or in a hurry   Blood in the urine  What are the symptoms of a kidney infection? -- The symptoms of a kidney infection can include the same urinary symptoms that happen with a bladder infection.  In addition, kidney infections can cause:   Fever   Back pain   Nausea or vomiting  How do I find out if I have a UTI? -- If you think that you might have a UTI, call your doctor or nurse. Sometimes, they can tell if you have a UTI just by learning about your symptoms.  Your doctor or nurse might do a simple urine test. If they think that you might have a kidney infection or are unsure what is causing your symptoms, they might also do a more involved urine test to check for bacteria.  How are UTIs treated? -- Most UTIs are treated with antibiotic pills. These pills work by killing the germs that cause the infection.   If you have a bladder infection, you will probably need to take antibiotics for 3 to 7 days.   If you have a kidney infection, you will probably need to take antibiotics for longer, maybe for up to 10 days. If you have a kidney infection, it's also possible that you will need to be treated in the hospital.  Your symptoms should begin to improve within a day of starting antibiotics. But you should finish all of the antibiotic pills. Otherwise, the infection might come back.  If needed, you can also take a medicine to numb your bladder. This medicine eases the pain caused by UTIs. It also reduces the need to urinate.  What if I get bladder infections a lot? -- First, check  "with your doctor or nurse to make sure that you are really having bladder infections. The symptoms of bladder infection can be caused by other things. Your doctor or nurse will want to see if those problems might be causing your symptoms.  But if you are really having repeated infections, there are things that you can do to keep from getting more infections. These include:   Drinking more fluid - This can help prevent bladder infections.   Vaginal estrogen - If you have already been through menopause, your doctor might suggest this. Vaginal estrogen comes in a cream or a flexible ring that you put into your vagina. It can help prevent bladder infections.  Other things that might help:   Avoid spermicides (sperm-killing creams or gels) - Spermicide is a form of birth control. It seems to increase the risk of bladder infections in some females, especially when used with a diaphragm. If you use spermicide and get a lot of bladder infections, you might want to try switching to a different form of birth control.   Urinate right after sex - Some doctors think this helps, because it helps flush out germs that might get into the bladder during sex. There is no proof it works, but it also cannot hurt.  If you get a lot of bladder infections, and the above methods have not helped, your doctor might give you antibiotics to help prevent infection. But long-term use of antibiotics has downsides, so doctors usually suggest trying other things first.  Can cranberry juice or other products prevent bladder infections? -- People often wonder about \"natural\" products that claim to help prevent bladder infections. These include cranberry juice and other cranberry products, probiotics, vitamin C, and D-mannose. There is not good evidence that these things work. However, there is also no clear evidence that they are harmful. If you have questions about these or other products, talk with your doctor or nurse.  All topics are updated as " new evidence becomes available and our peer review process is complete.  This topic retrieved from [employer] on: May 09, 2024.  Topic 36287 Version 24.0  Release: 32.4.3 - C32.128  © 2024 UpToDate, Inc. and/or its affiliates. All rights reserved.  figure 1: Anatomy of the urinary tract     Urine is made by the kidneys. It passes from the kidneys into the bladder through 2 tubes called the ureters. Then, it leaves the bladder through another tube called the urethra.  Graphic 74170 Version 8.0  Consumer Information Use and Disclaimer   Disclaimer: This generalized information is a limited summary of diagnosis, treatment, and/or medication information. It is not meant to be comprehensive and should be used as a tool to help the user understand and/or assess potential diagnostic and treatment options. It does NOT include all information about conditions, treatments, medications, side effects, or risks that may apply to a specific patient. It is not intended to be medical advice or a substitute for the medical advice, diagnosis, or treatment of a health care provider based on the health care provider's examination and assessment of a patient's specific and unique circumstances. Patients must speak with a health care provider for complete information about their health, medical questions, and treatment options, including any risks or benefits regarding use of medications. This information does not endorse any treatments or medications as safe, effective, or approved for treating a specific patient. UpToDate, Inc. and its affiliates disclaim any warranty or liability relating to this information or the use thereof.The use of this information is governed by the Terms of Use, available at https://www.wolterskluwer.com/en/know/clinical-effectiveness-terms. 2024© Up[employer]Date, Inc. and its affiliates and/or licensors. All rights reserved.  Copyright   © 2024 Up[employer]Date, Inc. and/or its affiliates. All rights reserved.

## 2024-07-06 NOTE — PROGRESS NOTES
[facility]        NAME: Yesenia Graham is a 78 y.o. female  : 1945    MRN: [id number]  DATE: 2024  TIME: 6:59 PM    Assessment and Plan   Acute cystitis with hematuria [N30.01]  1. Acute cystitis with hematuria  cephalexin (KEFLEX) 500 mg capsule      2. Burning with urination  POCT urine dip auto non-scope    Urine culture        Urine Dip: Turbid, large leuks, small bilirubin, large blood, trace protein.  Urine culture sent out.    Prescribed course of Keflex for UTI. Advised symptomatic treatment.    Patient Instructions     Urine dip showed signs of potential UTI. Prescribed antibiotics, use as directed. Will send culture out to determine bacteria causing infection and susceptibility to antibiotics. May try over the counter Azo to decrease burning with urination, over the counter Tylenol or ibuprofen as directed on packaging as needed for pain. Drink plenty of fluids and follow proper urinary hygiene.    Follow up with PCP in 3-5 days.  Proceed to  ER if symptoms worsen.    If tests are performed, our office will contact you with results only if changes need to made to the care plan discussed with you at the visit. [facility]    Chief Complaint     Chief Complaint   Patient presents with    Possible UTI     Patient c/o urinary burning and itching x 2-3 days          History of Present Illness       Urinary Tract Infection   This is a new problem. Episode onset: about 3 days ago. The problem occurs every urination. The problem has been gradually worsening. The pain is at a severity of 2/10. There has been no fever. There is No history of pyelonephritis. Associated symptoms include flank pain, frequency, nausea and urgency. Pertinent negatives include no chills, hematuria or vomiting. She has tried nothing for the symptoms.       Review of Systems   Review of Systems   Constitutional:  Negative for chills and fever.   Gastrointestinal:   Positive for abdominal pain (pelvic) and nausea. Negative for diarrhea and vomiting.   Genitourinary:  Positive for dysuria, flank pain, frequency, pelvic pain and urgency. Negative for hematuria.   Musculoskeletal:  Negative for myalgias.         Current Medications       Current Outpatient Medications:     cephalexin (KEFLEX) 500 mg capsule, Take 1 capsule (500 mg total) by mouth every 12 (twelve) hours for 7 days, Disp: 14 capsule, Rfl: 0    clobetasol (TEMOVATE) 0.05 % cream, APPLY 1 APPLICATION TO THE SKIN DAILY AS NEEDED, Disp: , Rfl:     Icosapent Ethyl 1 g CAPS, Take 2 capsules by mouth 2 (two) times a day, Disp: , Rfl:     levofloxacin (LEVAQUIN) 500 mg tablet, Take 500 mg by mouth daily, Disp: , Rfl:     losartan (COZAAR) 100 MG tablet, Take 100 mg by mouth daily, Disp: , Rfl:     nystatin (MYCOSTATIN) powder, APPLY 1 APPLICATION TOPICALLY ON THE SKIN TWICE A DAY, Disp: , Rfl:     amLODIPine (NORVASC) 2.5 mg tablet, Take 2.5 mg by mouth daily, Disp: , Rfl:     atorvastatin (LIPITOR) 80 mg tablet, TAKE 1 TABLET BY ORAL ROUTE EVERY DAY, Disp: , Rfl:     azelastine (ASTELIN) 0.1 % nasal spray, 1 spray into each nostril 2 (two) times a day Use in each nostril as directed, Disp: 1 mL, Rfl: 0    benzonatate (TESSALON) 200 MG capsule, Take 1 capsule (200 mg total) by mouth 3 (three) times a day as needed for cough (Patient not taking: Reported on 4/8/2023), Disp: 20 capsule, Rfl: 0    benzonatate (TESSALON) 200 MG capsule, Take 1 capsule (200 mg total) by mouth 3 (three) times a day as needed for cough (Patient not taking: Reported on 10/19/2023), Disp: 20 capsule, Rfl: 0    candesartan (ATACAND) 32 MG tablet, , Disp: , Rfl:     conjugated estrogens (Premarin) vaginal cream, 1/2 applicator twice weekly at bed time., Disp: , Rfl:     fluconazole (DIFLUCAN) 100 mg tablet, Take 100 mg by mouth daily, Disp: , Rfl:     fluconazole (DIFLUCAN) 150 mg tablet, Take by mouth, Disp: , Rfl:     fluticasone (FLONASE) 50  mcg/act nasal spray, SPRAY 2 SPRAYS INTO EACH NOSTRIL EVERY DAY (Patient not taking: Reported on 10/19/2023), Disp: 16 mL, Rfl: 0    fluticasone (FLONASE) 50 mcg/act nasal spray, 2 sprays into each nostril daily, Disp: 16 g, Rfl: 0    hydrochlorothiazide (HYDRODIURIL) 25 mg tablet, TAKE 1 TABLET BY ORAL ROUTE EVERY DAY, Disp: , Rfl:     levothyroxine 150 mcg tablet, TAKE 1 TABLET DAILY, Disp: , Rfl:     losartan-hydrochlorothiazide (HYZAAR) 100-12.5 MG per tablet, Take 1 tablet by mouth daily, Disp: , Rfl:     nebivolol (BYSTOLIC) 5 mg tablet, TAKE 1 TABLET BY ORAL ROUTE EVERY DAY, Disp: , Rfl:     nitrofurantoin (MACROBID) 100 mg capsule, Take 1 capsule (100 mg total) by mouth 2 (two) times a day (Patient not taking: Reported on 4/8/2023), Disp: 14 capsule, Rfl: 0    omeprazole (PriLOSEC) 20 mg delayed release capsule, Take 20 mg by mouth daily Take before a meal, Disp: , Rfl:     Current Allergies     Allergies as of 07/06/2024 - Reviewed 10/19/2023   Allergen Reaction Noted    Sulfa antibiotics  11/13/2018            The following portions of the patient's history were reviewed and updated as appropriate: allergies, current medications, past family history, past medical history, past social history, past surgical history and problem list.     Past Medical History:   Diagnosis Date    Disease of thyroid gland     GERD (gastroesophageal reflux disease)     Hypertension        Past Surgical History:   Procedure Laterality Date    HYSTERECTOMY         No family history on file.      Medications have been verified.        Objective   /82 Comment: Right arm, seated, manually  Pulse 77   Temp 98.1 °F (36.7 °C)   Resp 18   SpO2 99%        Physical Exam     Physical Exam  Vitals and nursing note reviewed.   Constitutional:       General: She is not in acute distress.     Appearance: Normal appearance. She is not ill-appearing.   Cardiovascular:      Rate and Rhythm: Normal rate and regular rhythm.      Pulses:  Normal pulses.      Heart sounds: Normal heart sounds.   Pulmonary:      Effort: Pulmonary effort is normal.      Breath sounds: Normal breath sounds.   Abdominal:      General: Abdomen is flat. Bowel sounds are normal. There is no distension.      Palpations: Abdomen is soft.      Tenderness: There is no abdominal tenderness. There is no right CVA tenderness, left CVA tenderness or guarding.   Neurological:      Mental Status: She is alert.

## 2024-07-08 LAB — BACTERIA UR CULT: NORMAL

## 2024-09-09 ENCOUNTER — OFFICE VISIT (OUTPATIENT)
Dept: URGENT CARE | Facility: MEDICAL CENTER | Age: 79
End: 2024-09-09
Payer: COMMERCIAL

## 2024-09-09 VITALS
SYSTOLIC BLOOD PRESSURE: 161 MMHG | HEART RATE: 61 BPM | DIASTOLIC BLOOD PRESSURE: 76 MMHG | RESPIRATION RATE: 18 BRPM | TEMPERATURE: 98.6 F | OXYGEN SATURATION: 99 %

## 2024-09-09 DIAGNOSIS — R35.0 URINARY FREQUENCY: Primary | ICD-10-CM

## 2024-09-09 LAB
SL AMB  POCT GLUCOSE, UA: ABNORMAL
SL AMB LEUKOCYTE ESTERASE,UA: ABNORMAL
SL AMB POCT BILIRUBIN,UA: ABNORMAL
SL AMB POCT BLOOD,UA: ABNORMAL
SL AMB POCT CLARITY,UA: ABNORMAL
SL AMB POCT COLOR,UA: YELLOW
SL AMB POCT KETONES,UA: ABNORMAL
SL AMB POCT NITRITE,UA: ABNORMAL
SL AMB POCT PH,UA: 6
SL AMB POCT SPECIFIC GRAVITY,UA: 1.01
SL AMB POCT URINE PROTEIN: ABNORMAL
SL AMB POCT UROBILINOGEN: 0.2

## 2024-09-09 PROCEDURE — 87086 URINE CULTURE/COLONY COUNT: CPT | Performed by: NURSE PRACTITIONER

## 2024-09-09 PROCEDURE — 99213 OFFICE O/P EST LOW 20 MIN: CPT | Performed by: NURSE PRACTITIONER

## 2024-09-09 RX ORDER — CEPHALEXIN 500 MG/1
500 CAPSULE ORAL EVERY 12 HOURS SCHEDULED
Qty: 14 CAPSULE | Refills: 0 | Status: SHIPPED | OUTPATIENT
Start: 2024-09-09 | End: 2024-09-16

## 2024-09-09 NOTE — PROGRESS NOTES
[facility]        NAME: Yesenia Graham is a 78 y.o. female  : 1945    MRN: [id number]  DATE: 2024  TIME: 4:02 PM    Assessment and Plan   Urinary frequency [R35.0]  1. Urinary frequency  POCT urine dip auto non-scope    Urine culture    cephalexin (KEFLEX) 500 mg capsule          Patient in NAD and VSS upon exam. Discussed results of UA in office. Will start abx for possible UTI and send culture. Will call patient with results and continue/change/discontinue as indicated. Patient declined STI testing at this time. Discussed supportive care and return precautions.      Patient Instructions       Follow up with PCP in 3-5 days.  Proceed to  ER if symptoms worsen.    If tests have been performed at [facility], our office will contact you with results if changes need to be made to the care plan discussed with you at the visit.  You can review your full results on [facility].    Chief Complaint     Chief Complaint   Patient presents with    Possible UTI     Patient c/o burning with urination, pressure and urinary frequency x 2 week.          History of Present Illness       Started: 2 weeks  Positive: dysuria, frequency, urgency, abdominal pressure, flank pain, back pain  Negative: hematuria, fever, vaginal d/c  Treatment: increased hydration        Review of Systems   Review of Systems   Constitutional:  Negative for fatigue and fever.   Gastrointestinal:  Positive for abdominal pain.   Genitourinary:  Positive for dysuria, frequency and urgency. Negative for flank pain and hematuria.   Musculoskeletal:  Negative for back pain.         Current Medications       Current Outpatient Medications:     cephalexin (KEFLEX) 500 mg capsule, Take 1 capsule (500 mg total) by mouth every 12 (twelve) hours for 7 days, Disp: 14 capsule, Rfl: 0    amLODIPine (NORVASC) 2.5 mg tablet, Take 2.5 mg by mouth daily, Disp: , Rfl:     atorvastatin (LIPITOR) 80 mg tablet, TAKE 1 TABLET BY ORAL ROUTE EVERY  DAY, Disp: , Rfl:     azelastine (ASTELIN) 0.1 % nasal spray, 1 spray into each nostril 2 (two) times a day Use in each nostril as directed, Disp: 1 mL, Rfl: 0    benzonatate (TESSALON) 200 MG capsule, Take 1 capsule (200 mg total) by mouth 3 (three) times a day as needed for cough (Patient not taking: Reported on 4/8/2023), Disp: 20 capsule, Rfl: 0    benzonatate (TESSALON) 200 MG capsule, Take 1 capsule (200 mg total) by mouth 3 (three) times a day as needed for cough (Patient not taking: Reported on 10/19/2023), Disp: 20 capsule, Rfl: 0    candesartan (ATACAND) 32 MG tablet, , Disp: , Rfl:     clobetasol (TEMOVATE) 0.05 % cream, APPLY 1 APPLICATION TO THE SKIN DAILY AS NEEDED, Disp: , Rfl:     conjugated estrogens (Premarin) vaginal cream, 1/2 applicator twice weekly at bed time., Disp: , Rfl:     fluconazole (DIFLUCAN) 100 mg tablet, Take 100 mg by mouth daily, Disp: , Rfl:     fluconazole (DIFLUCAN) 150 mg tablet, Take by mouth, Disp: , Rfl:     fluticasone (FLONASE) 50 mcg/act nasal spray, SPRAY 2 SPRAYS INTO EACH NOSTRIL EVERY DAY (Patient not taking: Reported on 10/19/2023), Disp: 16 mL, Rfl: 0    fluticasone (FLONASE) 50 mcg/act nasal spray, 2 sprays into each nostril daily, Disp: 16 g, Rfl: 0    hydrochlorothiazide (HYDRODIURIL) 25 mg tablet, TAKE 1 TABLET BY ORAL ROUTE EVERY DAY, Disp: , Rfl:     Icosapent Ethyl 1 g CAPS, Take 2 capsules by mouth 2 (two) times a day, Disp: , Rfl:     levofloxacin (LEVAQUIN) 500 mg tablet, Take 500 mg by mouth daily, Disp: , Rfl:     levothyroxine 150 mcg tablet, TAKE 1 TABLET DAILY, Disp: , Rfl:     losartan (COZAAR) 100 MG tablet, Take 100 mg by mouth daily, Disp: , Rfl:     losartan-hydrochlorothiazide (HYZAAR) 100-12.5 MG per tablet, Take 1 tablet by mouth daily, Disp: , Rfl:     nebivolol (BYSTOLIC) 5 mg tablet, TAKE 1 TABLET BY ORAL ROUTE EVERY DAY, Disp: , Rfl:     nitrofurantoin (MACROBID) 100 mg capsule, Take 1 capsule (100 mg total) by mouth 2 (two) times a day  (Patient not taking: Reported on 4/8/2023), Disp: 14 capsule, Rfl: 0    nystatin (MYCOSTATIN) powder, APPLY 1 APPLICATION TOPICALLY ON THE SKIN TWICE A DAY, Disp: , Rfl:     omeprazole (PriLOSEC) 20 mg delayed release capsule, Take 20 mg by mouth daily Take before a meal, Disp: , Rfl:     Current Allergies     Allergies as of 09/09/2024 - Reviewed 09/09/2024   Allergen Reaction Noted    Sulfa antibiotics  11/13/2018            The following portions of the patient's history were reviewed and updated as appropriate: allergies, current medications, past family history, past medical history, past social history, past surgical history and problem list.     Past Medical History:   Diagnosis Date    Disease of thyroid gland     GERD (gastroesophageal reflux disease)     Hypertension        Past Surgical History:   Procedure Laterality Date    HYSTERECTOMY         No family history on file.      Medications have been verified.        Objective   /76   Pulse 61   Temp 98.6 °F (37 °C)   Resp 18   SpO2 99%   No LMP recorded. Patient is postmenopausal.       Physical Exam     Physical Exam  Constitutional:       Appearance: Normal appearance.   HENT:      Head: Normocephalic and atraumatic.   Cardiovascular:      Rate and Rhythm: Normal rate.   Pulmonary:      Effort: Pulmonary effort is normal.   Abdominal:      General: Abdomen is flat. Bowel sounds are normal.      Palpations: Abdomen is soft.      Tenderness: There is no abdominal tenderness. There is no right CVA tenderness or left CVA tenderness.   Musculoskeletal:         General: Normal range of motion.   Skin:     General: Skin is warm and dry.   Neurological:      Mental Status: She is alert and oriented to person, place, and time.

## 2024-09-09 NOTE — PATIENT INSTRUCTIONS
Take antibiotics as directed  Can use Pyridium (over the counter) for help with suprapubic pressure, this will turn your urine orange  Increased hydration  If worsening symptoms, ie blood in urine, back or flank pain, fevers, vomiting, please go to the Emergency Room  We will call you in 1-3 days with results of your urine culture and modify your treatment as needed

## 2024-09-10 LAB — BACTERIA UR CULT: NORMAL

## 2024-10-21 ENCOUNTER — HOSPITAL ENCOUNTER (OUTPATIENT)
Dept: ULTRASOUND IMAGING | Facility: HOSPITAL | Age: 79
Discharge: HOME | End: 2024-10-21
Payer: COMMERCIAL

## 2024-10-21 DIAGNOSIS — K80.20 SYMPTOMATIC CHOLELITHIASIS: ICD-10-CM

## 2024-10-21 PROCEDURE — 76705 ECHO EXAM OF ABDOMEN: CPT

## 2025-02-07 ENCOUNTER — OFFICE VISIT (OUTPATIENT)
Dept: URGENT CARE | Facility: MEDICAL CENTER | Age: 80
End: 2025-02-07
Payer: COMMERCIAL

## 2025-02-07 VITALS
SYSTOLIC BLOOD PRESSURE: 154 MMHG | DIASTOLIC BLOOD PRESSURE: 80 MMHG | RESPIRATION RATE: 18 BRPM | OXYGEN SATURATION: 95 % | HEART RATE: 65 BPM | TEMPERATURE: 98.5 F

## 2025-02-07 DIAGNOSIS — L03.319 CELLULITIS OF TRUNK, UNSPECIFIED SITE OF TRUNK: ICD-10-CM

## 2025-02-07 DIAGNOSIS — L30.4 INTERTRIGO: Primary | ICD-10-CM

## 2025-02-07 PROCEDURE — 99213 OFFICE O/P EST LOW 20 MIN: CPT

## 2025-02-07 RX ORDER — SEMAGLUTIDE 1.7 MG/.75ML
INJECTION, SOLUTION SUBCUTANEOUS
COMMUNITY
Start: 2025-01-14

## 2025-02-07 RX ORDER — SEMAGLUTIDE 1 MG/.5ML
INJECTION, SOLUTION SUBCUTANEOUS
COMMUNITY
Start: 2024-12-17

## 2025-02-07 RX ORDER — SEMAGLUTIDE 0.5 MG/.5ML
INJECTION, SOLUTION SUBCUTANEOUS
COMMUNITY
Start: 2024-11-15

## 2025-02-07 RX ORDER — CEPHALEXIN 500 MG/1
500 CAPSULE ORAL EVERY 8 HOURS SCHEDULED
Qty: 21 CAPSULE | Refills: 0 | Status: SHIPPED | OUTPATIENT
Start: 2025-02-07 | End: 2025-02-14

## 2025-02-07 RX ORDER — NYSTATIN 100000 [USP'U]/G
POWDER TOPICAL 3 TIMES DAILY
Qty: 15 G | Refills: 0 | Status: SHIPPED | OUTPATIENT
Start: 2025-02-07

## 2025-02-07 RX ORDER — BISACODYL 5 MG
1 TABLET, DELAYED RELEASE (ENTERIC COATED) ORAL DAILY
COMMUNITY
Start: 2025-01-14

## 2025-02-07 NOTE — PATIENT INSTRUCTIONS
Prescribed course of Keflex, Nystatin powder - use both medications as directed.  Wash area daily with soap, water.  Keep area dry (gently pat to dry, do not rub).  Make sure your PCP rechecks the area at your scheduled follow up next week.  Over the counter Tylenol as needed for pain.    Follow up with PCP in 3-5 days.  Proceed to  ER if symptoms worsen.    If tests are performed, our office will contact you with results only if changes need to made to the care plan discussed with you at the visit. [facility].    Patient Education     Intertrigo   About this topic   Intertrigo is a skin problem that happens in the folds of your skin. It can happen anywhere your skin rubs other skin. Many people have problems with intertrigo in their groin, armpits, under their breasts, or under their stomach.  What are the causes?   Many things can work together to cause this problem or make it worse like:  Moisture  Infection  Skin that rubs together  Something that irritates your skin or that you are allergic to, like a soap or lotion  What can make this more likely to happen?   You are more likely to have this problem if you:  Are very young or an older person  Are incontinent or bedridden  Have a weak immune system  Sweat a lot  Are overweight or have extra skin  Have diabetes  Wear a splint, brace, or artificial limb  What are the main signs?   Skin is red or red-brown, moist, and may be cracked  Pain  Area may burn or itch  Odor  How does the doctor diagnose this health problem?   Your doctor will ask about your skin problem and how it started. Your doctor will look over your skin and into your mouth as part of an exam. The doctor may use a special light to look at your skin or take skin scrapings to send to the lab. The doctor will also ask about your contact with chemicals at home and work, as well as any drugs you take.  How does the doctor treat this health problem?   Your  doctor may tell you to:  Clean the area each day and dry with a hair dryer set on cool.  Use special powders to keep the area dry.  Keep the skin folds open to air as much as possible.  Use cotton or wool clothes to separate the skin.  Put protective creams on your skin if they come in contact with urine or stool.  Lose weight if you are overweight.  What drugs may be needed?   The doctor may order drugs to:  Help with pain  Fight an infection  Relieve itching  Last Reviewed Date   2021-07-19  Consumer Information Use and Disclaimer   This generalized information is a limited summary of diagnosis, treatment, and/or medication information. It is not meant to be comprehensive and should be used as a tool to help the user understand and/or assess potential diagnostic and treatment options. It does NOT include all information about conditions, treatments, medications, side effects, or risks that may apply to a specific patient. It is not intended to be medical advice or a substitute for the medical advice, diagnosis, or treatment of a health care provider based on the health care provider's examination and assessment of a patient’s specific and unique circumstances. Patients must speak with a health care provider for complete information about their health, medical questions, and treatment options, including any risks or benefits regarding use of medications. This information does not endorse any treatments or medications as safe, effective, or approved for treating a specific patient. UpToDate, Inc. and its affiliates disclaim any warranty or liability relating to this information or the use thereof. The use of this information is governed by the Terms of Use, available at https://www.w[employer]uwer.com/en/know/clinical-effectiveness-terms   Copyright   Copyright © 2024 UpToDate, Inc. and its affiliates and/or licensors. All rights reserved.    Patient Education     Cellulitis and erysipelas (skin infections)   The  Basics   Written by the doctors and editors at [facility]   What are cellulitis and erysipelas? -- Cellulitis and erysipelas are both infections of the skin. These infections can cause redness, pain, and swelling. The difference between them is that erysipelas tends to affect the upper layers of skin, and cellulitis tends to affect deep layers of skin and sometimes the fat under the skin.  Cellulitis and erysipelas can happen when germs get into the skin. Normally, different types of germs live on a person's skin. Most of the time, these germs do not cause any problems. But if a person gets a cut or a break in the skin, the germs can get into the skin and cause an infection.  Certain conditions can increase a person's chance of getting cellulitis or erysipelas. These include:   Having a cut (even a tiny one)   Having another type of skin infection or a long-term skin condition   Having swelling of the skin or swelling in the body   Being overweight  What are the symptoms of cellulitis and erysipelas? -- Both types of infection cause very similar symptoms. Either infection can cause the infected area to be painful, red, swollen, or warm. Some people with cellulitis or erysipelas can sometimes also have fever or chills. And sometimes, people with these infections have no symptoms or only some of these symptoms.  Most of the time, cellulitis and erysipelas happen on the legs or arms. But people can get these infections in other places, such as the belly, face, in the mouth, or around the anus.  Will I need tests? -- Most people do not need any tests. Your doctor or nurse will do an exam and look at your skin.  It's important for a doctor or nurse to do an exam to figure out what kind of infection you have. The right treatment for a skin infection depends on the type of infection it is and which germs are causing it. Your doctor or nurse might need to do tests to figure out the cause of your infection.  If you have  "cellulitis or erysipelas, it's important to get treated. These infections can spread to the whole body and become serious if not treated.  How are cellulitis and erysipelas treated? -- These infections are treated with antibiotic pills. If your doctor prescribes medicine for you to take at home, it is important to follow the directions exactly. Take all of the pills you are given, even if you feel better before you finish them. If you do not take all the pills, the infection can come back worse.  People who have severe infections might be treated in the hospital and given antibiotics through a thin tube that goes into a vein, called an \"IV\".  What can I do to help treat my infection? -- You can:   Raise your arm or leg (if your infection is on your arm or leg) to reduce swelling - Raise the arm or leg up above the level of your heart 3 or 4 times a day, for 30 minutes each time.   Keep the infected area clean and dry - You can take a shower or bath, but be sure to pat the area dry with a towel afterward.  Antibiotic ointments or creams do not work to treat cellulitis and erysipelas.  Should I call my doctor or nurse? -- You should call your doctor or nurse if your symptoms do not get better within 3 days of starting treatment. You should also call if the affected area gets:   Bigger   More swollen   More painful  Your doctor or nurse might do another exam or tests to see if you need different medicines.  Can skin infections be prevented? -- Sometimes. If you cut your skin, make sure to wash the area well with soap and water. This can help prevent the area from getting infected. If you have a long-term skin condition, ask your doctor or nurse what you can do to help prevent infections.  All topics are updated as new evidence becomes available and our peer review process is complete.  This topic retrieved from [employer] on: Feb 26, 2024.  Topic 71964 Version 15.0  Release: 32.2.4 - C32.56  © 2024 UpToDate, Inc. and/or " its affiliates. All rights reserved.  Consumer Information Use and Disclaimer   Disclaimer: This generalized information is a limited summary of diagnosis, treatment, and/or medication information. It is not meant to be comprehensive and should be used as a tool to help the user understand and/or assess potential diagnostic and treatment options. It does NOT include all information about conditions, treatments, medications, side effects, or risks that may apply to a specific patient. It is not intended to be medical advice or a substitute for the medical advice, diagnosis, or treatment of a health care provider based on the health care provider's examination and assessment of a patient's specific and unique circumstances. Patients must speak with a health care provider for complete information about their health, medical questions, and treatment options, including any risks or benefits regarding use of medications. This information does not endorse any treatments or medications as safe, effective, or approved for treating a specific patient. UpToDate, Inc. and its affiliates disclaim any warranty or liability relating to this information or the use thereof.The use of this information is governed by the Terms of Use, available at https://www.[employer].com/en/know/clinical-effectiveness-terms. 2024© UpToDate, Inc. and its affiliates and/or licensors. All rights reserved.  Copyright   © 2024 UpToDate, Inc. and/or its affiliates. All rights reserved.

## 2025-02-07 NOTE — PROGRESS NOTES
"  [facility]        NAME: Yesenia Graham is a 79 y.o. female  : 1945    MRN: [id number]  DATE: 2025  TIME: 4:03 PM    Assessment and Plan   Intertrigo [L30.4]  1. Intertrigo  cephalexin (KEFLEX) 500 mg capsule    nystatin (MYCOSTATIN) powder      2. Cellulitis of trunk, unspecified site of trunk  cephalexin (KEFLEX) 500 mg capsule    nystatin (MYCOSTATIN) powder        Presentation consistent with intertrigo with concerns for secondary cellulitis.  Prescribed course of Nystatin powder, Keflex.  Advised symptomatic treatment.  Patient has PCP follow up in 4 days.  Advised patient to make sure PCP evaluates area.    Patient Instructions     Prescribed course of Keflex, Nystatin powder - use both medications as directed.  Wash area daily with soap, water.  Keep area dry (gently pat to dry, do not rub).  Make sure your PCP rechecks the area at your scheduled follow up next week.  Over the counter Tylenol as needed for pain.    Follow up with PCP in 3-5 days.  Proceed to  ER if symptoms worsen.    If tests are performed, our office will contact you with results only if changes need to made to the care plan discussed with you at the visit. You can review your full results on [facility].    Chief Complaint     Chief Complaint   Patient presents with    Rash     Patient c/o a rash under her left breast x 2 weeks. She noted pain with drainage.          History of Present Illness       Patient presents for evaluation of rash.  Notes symptoms started about 2 weeks ago.  Describes rash as red, burning.  Occasional itching in the area.  Notes area has some discharge and smells \"putrid.\"  States she feels the area has been worse since applying ketoconazole cream as it has made the area mor wet and irritated.    Rash  This is a new problem. The current episode started 1 to 4 weeks ago. The problem has been gradually worsening since onset. The affected locations include the chest (under left " breast). The rash is characterized by itchiness, redness, pain and draining. She was exposed to nothing. Pertinent negatives include no fever. Treatments tried: ketoconazole cream, talc powder. The treatment provided no relief. There were no sick contacts.       Review of Systems   Review of Systems   Constitutional:  Negative for chills and fever.   Musculoskeletal:  Negative for myalgias.   Skin:  Positive for rash.         Current Medications       Current Outpatient Medications:     bisacodyl 5 MG EC tablet, Take 1 tablet by mouth in the morning, Disp: , Rfl:     cephalexin (KEFLEX) 500 mg capsule, Take 1 capsule (500 mg total) by mouth every 8 (eight) hours for 7 days, Disp: 21 capsule, Rfl: 0    nystatin (MYCOSTATIN) powder, Apply topically 3 (three) times a day, Disp: 15 g, Rfl: 0    Wegovy 0.5 MG/0.5ML, inject 0.5 mg subcutaneously weekly, Disp: , Rfl:     Wegovy 1 MG/0.5ML, inject 1 mg subcutaneously every week, Disp: , Rfl:     Wegovy 1.7 MG/0.75ML, BELOW THE SKIN INJECT 1.7MG SUBCUTANEOUSLY ROUTE ONCE WEEKLY, Disp: , Rfl:     amLODIPine (NORVASC) 2.5 mg tablet, Take 2.5 mg by mouth daily, Disp: , Rfl:     atorvastatin (LIPITOR) 80 mg tablet, TAKE 1 TABLET BY ORAL ROUTE EVERY DAY, Disp: , Rfl:     azelastine (ASTELIN) 0.1 % nasal spray, 1 spray into each nostril 2 (two) times a day Use in each nostril as directed, Disp: 1 mL, Rfl: 0    benzonatate (TESSALON) 200 MG capsule, Take 1 capsule (200 mg total) by mouth 3 (three) times a day as needed for cough (Patient not taking: Reported on 4/8/2023), Disp: 20 capsule, Rfl: 0    benzonatate (TESSALON) 200 MG capsule, Take 1 capsule (200 mg total) by mouth 3 (three) times a day as needed for cough (Patient not taking: Reported on 10/19/2023), Disp: 20 capsule, Rfl: 0    candesartan (ATACAND) 32 MG tablet, , Disp: , Rfl:     clobetasol (TEMOVATE) 0.05 % cream, APPLY 1 APPLICATION TO THE SKIN DAILY AS NEEDED, Disp: , Rfl:     conjugated estrogens (Premarin)  vaginal cream, 1/2 applicator twice weekly at bed time., Disp: , Rfl:     fluconazole (DIFLUCAN) 100 mg tablet, Take 100 mg by mouth daily, Disp: , Rfl:     fluconazole (DIFLUCAN) 150 mg tablet, Take by mouth, Disp: , Rfl:     fluticasone (FLONASE) 50 mcg/act nasal spray, 2 sprays into each nostril daily, Disp: 16 g, Rfl: 0    hydrochlorothiazide (HYDRODIURIL) 25 mg tablet, TAKE 1 TABLET BY ORAL ROUTE EVERY DAY, Disp: , Rfl:     Icosapent Ethyl 1 g CAPS, Take 2 capsules by mouth 2 (two) times a day, Disp: , Rfl:     levofloxacin (LEVAQUIN) 500 mg tablet, Take 500 mg by mouth daily, Disp: , Rfl:     levothyroxine 150 mcg tablet, TAKE 1 TABLET DAILY, Disp: , Rfl:     losartan (COZAAR) 100 MG tablet, Take 100 mg by mouth daily, Disp: , Rfl:     losartan-hydrochlorothiazide (HYZAAR) 100-12.5 MG per tablet, Take 1 tablet by mouth daily, Disp: , Rfl:     nebivolol (BYSTOLIC) 5 mg tablet, TAKE 1 TABLET BY ORAL ROUTE EVERY DAY, Disp: , Rfl:     nitrofurantoin (MACROBID) 100 mg capsule, Take 1 capsule (100 mg total) by mouth 2 (two) times a day (Patient not taking: Reported on 4/8/2023), Disp: 14 capsule, Rfl: 0    omeprazole (PriLOSEC) 20 mg delayed release capsule, Take 20 mg by mouth daily Take before a meal, Disp: , Rfl:     Current Allergies     Allergies as of 02/07/2025 - Reviewed 02/07/2025   Allergen Reaction Noted    Sulfa antibiotics  11/13/2018            The following portions of the patient's history were reviewed and updated as appropriate: allergies, current medications, past family history, past medical history, past social history, past surgical history and problem list.     Past Medical History:   Diagnosis Date    Disease of thyroid gland     GERD (gastroesophageal reflux disease)     Hypertension        Past Surgical History:   Procedure Laterality Date    HYSTERECTOMY         No family history on file.      Medications have been verified.        Objective   /80   Pulse 65   Temp 98.5 °F (36.9  °C)   Resp 18   SpO2 95%        Physical Exam     Physical Exam  Vitals and nursing note reviewed.   Constitutional:       General: She is not in acute distress.     Appearance: Normal appearance. She is not ill-appearing.   Cardiovascular:      Rate and Rhythm: Normal rate and regular rhythm.      Pulses: Normal pulses.      Heart sounds: Normal heart sounds. No murmur heard.  Pulmonary:      Effort: Pulmonary effort is normal. No respiratory distress.      Breath sounds: Normal breath sounds. No stridor. No wheezing, rhonchi or rales.   Skin:     Comments: Erythematous rash below left breast.  Mild skin sloughing, serosanguinous drainage.  Tender to touch.   Neurological:      Mental Status: She is alert.

## 2025-07-04 ENCOUNTER — OFFICE VISIT (OUTPATIENT)
Dept: URGENT CARE | Facility: MEDICAL CENTER | Age: 80
End: 2025-07-04
Payer: COMMERCIAL

## 2025-07-04 VITALS
DIASTOLIC BLOOD PRESSURE: 74 MMHG | RESPIRATION RATE: 18 BRPM | OXYGEN SATURATION: 96 % | HEART RATE: 68 BPM | SYSTOLIC BLOOD PRESSURE: 141 MMHG | TEMPERATURE: 97.7 F

## 2025-07-04 DIAGNOSIS — R05.1 ACUTE COUGH: ICD-10-CM

## 2025-07-04 DIAGNOSIS — J02.9 ACUTE PHARYNGITIS, UNSPECIFIED ETIOLOGY: Primary | ICD-10-CM

## 2025-07-04 LAB
S PYO AG THROAT QL: NEGATIVE
SARS-COV-2 AG UPPER RESP QL IA: NEGATIVE
VALID CONTROL: NORMAL

## 2025-07-04 PROCEDURE — 99213 OFFICE O/P EST LOW 20 MIN: CPT | Performed by: PHYSICIAN ASSISTANT

## 2025-07-04 PROCEDURE — 87880 STREP A ASSAY W/OPTIC: CPT | Performed by: PHYSICIAN ASSISTANT

## 2025-07-04 PROCEDURE — 87811 SARS-COV-2 COVID19 W/OPTIC: CPT | Performed by: PHYSICIAN ASSISTANT

## 2025-07-04 RX ORDER — BENZONATATE 200 MG/1
200 CAPSULE ORAL 3 TIMES DAILY PRN
Qty: 20 CAPSULE | Refills: 0 | Status: SHIPPED | OUTPATIENT
Start: 2025-07-04

## 2025-07-04 RX ORDER — FLUTICASONE PROPIONATE 50 MCG
2 SPRAY, SUSPENSION (ML) NASAL DAILY
Qty: 9.9 ML | Refills: 0 | Status: SHIPPED | OUTPATIENT
Start: 2025-07-04

## 2025-07-04 NOTE — PROGRESS NOTES
[facility]        NAME: Yesenia Graham is a 79 y.o. female  : 1945    MRN: [id number]  DATE: 2025  TIME: 2:15 PM    Assessment and Plan   Acute pharyngitis, unspecified etiology [J02.9]  1. Acute pharyngitis, unspecified etiology  POCT rapid ANTIGEN strepA    fluticasone (FLONASE) 50 mcg/act nasal spray      2. Acute cough  Poct Covid 19 Rapid Antigen Test    benzonatate (TESSALON) 200 MG capsule            Patient Instructions     Patient has pharyngitis which I suspect is viral along with acute cough.  Rapid strep A screen is negative as well as rapid COVID test.  I prescribed her Flonase and Tessalon Perles recommended fluids, rest, discussed OTC cough and cold meds, pain control, close observation.  Follow up with PCP in 3-5 days.  Proceed to  ER if symptoms worsen.    If tests have been performed at [facility], our office will contact you with results if changes need to be made to the care plan discussed with you at the visit.  [facility]    Chief Complaint     Chief Complaint   Patient presents with    Cold Like Symptoms     Patient reports sore throat, runny nose, and fatigue for last 3 days. She is going to visit [facility] on  and wanted to make sure she was ok         History of Present Illness       Patient presents with 3-day history of fatigue, runny nose, sore throat, dry cough.  Denies shortness of breath or wheezing, fever, chills, NVD.  She is going to be visiting her grandchildren and wants to make sure is okay to be around them and wants to be tested for strep and COVID today.        Review of Systems   Review of Systems   Constitutional: Negative.    HENT:  Positive for rhinorrhea and sore throat.    Respiratory:  Positive for cough. Negative for shortness of breath and wheezing.    Cardiovascular: Negative.    Gastrointestinal: Negative.    Genitourinary: Negative.          Current Medications     Current  Medications[1]    Current Allergies     Allergies as of 07/04/2025 - Reviewed 07/04/2025   Allergen Reaction Noted    Sulfa antibiotics  11/13/2018            The following portions of the patient's history were reviewed and updated as appropriate: allergies, current medications, past family history, past medical history, past social history, past surgical history and problem list.     Past Medical History[2]    Past Surgical History[3]    Family History[4]      Medications have been verified.        Objective   /74   Pulse 68   Temp 97.7 °F (36.5 °C)   Resp 18   SpO2 96%   No LMP recorded. Patient is postmenopausal.       Physical Exam     Physical Exam  Vitals reviewed.   Constitutional:       General: She is not in acute distress.     Appearance: She is well-developed.   HENT:      Right Ear: Hearing, tympanic membrane, ear canal and external ear normal.      Left Ear: Hearing, tympanic membrane, ear canal and external ear normal.      Nose: Mucosal edema (B/L boggy turbinates) and congestion present.      Mouth/Throat:      Mouth: Mucous membranes are moist.      Pharynx: Posterior oropharyngeal erythema (PND) present. No oropharyngeal exudate.      Tonsils: No tonsillar exudate.     Cardiovascular:      Rate and Rhythm: Normal rate and regular rhythm.      Pulses: Normal pulses.      Heart sounds: Normal heart sounds. No murmur heard.  Pulmonary:      Effort: Pulmonary effort is normal. No respiratory distress.      Breath sounds: Normal breath sounds.     Musculoskeletal:      Cervical back: Neck supple.   Lymphadenopathy:      Cervical: No cervical adenopathy.     Neurological:      Mental Status: She is alert and oriented to person, place, and time.                        [1]   Current Outpatient Medications:     atorvastatin (LIPITOR) 80 mg tablet, , Disp: , Rfl:     azelastine (ASTELIN) 0.1 % nasal spray, 1 spray into each nostril 2 (two) times a day Use in each nostril as directed, Disp: 1 mL,  Rfl: 0    benzonatate (TESSALON) 200 MG capsule, Take 1 capsule (200 mg total) by mouth 3 (three) times a day as needed for cough, Disp: 20 capsule, Rfl: 0    bisacodyl 5 MG EC tablet, Take 1 tablet by mouth in the morning, Disp: , Rfl:     candesartan (ATACAND) 32 MG tablet, , Disp: , Rfl:     clobetasol (TEMOVATE) 0.05 % cream, , Disp: , Rfl:     fluconazole (DIFLUCAN) 100 mg tablet, Take 100 mg by mouth in the morning., Disp: , Rfl:     fluconazole (DIFLUCAN) 150 mg tablet, Take by mouth, Disp: , Rfl:     fluticasone (FLONASE) 50 mcg/act nasal spray, 2 sprays into each nostril daily, Disp: 9.9 mL, Rfl: 0    Icosapent Ethyl 1 g CAPS, Take 2 capsules by mouth in the morning and 2 capsules in the evening., Disp: , Rfl:     levofloxacin (LEVAQUIN) 500 mg tablet, Take 500 mg by mouth in the morning., Disp: , Rfl:     levothyroxine 150 mcg tablet, , Disp: , Rfl:     losartan (COZAAR) 100 MG tablet, Take 100 mg by mouth in the morning., Disp: , Rfl:     nebivolol (BYSTOLIC) 5 mg tablet, , Disp: , Rfl:     nystatin (MYCOSTATIN) powder, Apply topically 3 (three) times a day, Disp: 15 g, Rfl: 0    omeprazole (PriLOSEC) 20 mg delayed release capsule, Take 20 mg by mouth in the morning. Take before a meal., Disp: , Rfl:     Wegovy 1.7 MG/0.75ML, , Disp: , Rfl:     amLODIPine (NORVASC) 2.5 mg tablet, Take 2.5 mg by mouth daily (Patient not taking: Reported on 7/4/2025), Disp: , Rfl:     conjugated estrogens (Premarin) vaginal cream, 1/2 applicator twice weekly at bed time., Disp: , Rfl:     hydrochlorothiazide (HYDRODIURIL) 25 mg tablet, TAKE 1 TABLET BY ORAL ROUTE EVERY DAY (Patient not taking: Reported on 7/4/2025), Disp: , Rfl:     losartan-hydrochlorothiazide (HYZAAR) 100-12.5 MG per tablet, Take 1 tablet by mouth daily (Patient not taking: Reported on 7/4/2025), Disp: , Rfl:     nitrofurantoin (MACROBID) 100 mg capsule, Take 1 capsule (100 mg total) by mouth 2 (two) times a day (Patient not taking: Reported on 4/8/2023),  Disp: 14 capsule, Rfl: 0    Wegovy 0.5 MG/0.5ML, inject 0.5 mg subcutaneously weekly (Patient not taking: Reported on 7/4/2025), Disp: , Rfl:     Wegovy 1 MG/0.5ML, inject 1 mg subcutaneously every week (Patient not taking: Reported on 7/4/2025), Disp: , Rfl:   [2]   Past Medical History:  Diagnosis Date    Disease of thyroid gland     GERD (gastroesophageal reflux disease)     Hypertension    [3]   Past Surgical History:  Procedure Laterality Date    HYSTERECTOMY     [4] No family history on file.

## 2025-08-14 ENCOUNTER — APPOINTMENT (OUTPATIENT)
Dept: NON INVASIVE DIAGNOSTICS | Facility: HOSPITAL | Age: 80
End: 2025-08-14
Payer: COMMERCIAL

## 2025-08-14 ENCOUNTER — APPOINTMENT (EMERGENCY)
Dept: CT IMAGING | Facility: HOSPITAL | Age: 80
End: 2025-08-14
Payer: COMMERCIAL

## 2025-08-14 ENCOUNTER — APPOINTMENT (EMERGENCY)
Dept: RADIOLOGY | Facility: HOSPITAL | Age: 80
End: 2025-08-14
Payer: COMMERCIAL

## 2025-08-14 ENCOUNTER — APPOINTMENT (OUTPATIENT)
Dept: MRI IMAGING | Facility: HOSPITAL | Age: 80
End: 2025-08-14
Payer: COMMERCIAL

## 2025-08-14 ENCOUNTER — HOSPITAL ENCOUNTER (OUTPATIENT)
Facility: HOSPITAL | Age: 80
Setting detail: OBSERVATION
Discharge: HOME | End: 2025-08-15
Attending: EMERGENCY MEDICINE | Admitting: INTERNAL MEDICINE
Payer: COMMERCIAL

## 2025-08-14 PROBLEM — E03.9 HYPOTHYROIDISM: Status: ACTIVE | Noted: 2025-08-14

## 2025-08-14 PROBLEM — R42 VERTIGO: Status: ACTIVE | Noted: 2025-08-14

## 2025-08-14 PROBLEM — I10 HTN (HYPERTENSION): Status: ACTIVE | Noted: 2025-08-14

## 2025-08-14 PROBLEM — E66.9 OBESITY: Status: ACTIVE | Noted: 2025-08-14

## 2025-08-14 PROBLEM — N39.0 UTI (URINARY TRACT INFECTION): Status: ACTIVE | Noted: 2025-08-14

## 2025-08-15 PROBLEM — H61.20 CERUMEN IMPACTION: Status: ACTIVE | Noted: 2025-08-15
